# Patient Record
Sex: FEMALE | Race: WHITE | Employment: STUDENT | URBAN - METROPOLITAN AREA
[De-identification: names, ages, dates, MRNs, and addresses within clinical notes are randomized per-mention and may not be internally consistent; named-entity substitution may affect disease eponyms.]

---
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
## 2023-04-27 ENCOUNTER — OFFICE VISIT (OUTPATIENT)
Dept: FAMILY MEDICINE CLINIC | Age: 18
End: 2023-04-27
Payer: COMMERCIAL

## 2023-04-27 VITALS
BODY MASS INDEX: 26.53 KG/M2 | TEMPERATURE: 98.5 F | HEART RATE: 115 BPM | OXYGEN SATURATION: 98 % | SYSTOLIC BLOOD PRESSURE: 118 MMHG | DIASTOLIC BLOOD PRESSURE: 80 MMHG | WEIGHT: 159.4 LBS

## 2023-04-27 DIAGNOSIS — S39.012A STRAIN OF LUMBAR REGION, INITIAL ENCOUNTER: Primary | ICD-10-CM

## 2023-04-27 PROCEDURE — 96372 THER/PROPH/DIAG INJ SC/IM: CPT | Performed by: PHYSICIAN ASSISTANT

## 2023-04-27 PROCEDURE — 99214 OFFICE O/P EST MOD 30 MIN: CPT | Performed by: PHYSICIAN ASSISTANT

## 2023-04-27 PROCEDURE — G8427 DOCREV CUR MEDS BY ELIG CLIN: HCPCS | Performed by: PHYSICIAN ASSISTANT

## 2023-04-27 PROCEDURE — 1036F TOBACCO NON-USER: CPT | Performed by: PHYSICIAN ASSISTANT

## 2023-04-27 PROCEDURE — G8417 CALC BMI ABV UP PARAM F/U: HCPCS | Performed by: PHYSICIAN ASSISTANT

## 2023-04-27 RX ORDER — TIZANIDINE 4 MG/1
4 TABLET ORAL 4 TIMES DAILY PRN
Qty: 20 TABLET | Refills: 0 | Status: SHIPPED | OUTPATIENT
Start: 2023-04-27

## 2023-04-27 RX ORDER — METHYLPREDNISOLONE ACETATE 80 MG/ML
80 INJECTION, SUSPENSION INTRA-ARTICULAR; INTRALESIONAL; INTRAMUSCULAR; SOFT TISSUE ONCE
Status: COMPLETED | OUTPATIENT
Start: 2023-04-27 | End: 2023-04-27

## 2023-04-27 RX ORDER — PREDNISONE 10 MG/1
TABLET ORAL
Qty: 18 TABLET | Refills: 0 | Status: SHIPPED | OUTPATIENT
Start: 2023-04-27

## 2023-04-27 RX ORDER — KETOROLAC TROMETHAMINE 30 MG/ML
30 INJECTION, SOLUTION INTRAMUSCULAR; INTRAVENOUS ONCE
Status: COMPLETED | OUTPATIENT
Start: 2023-04-27 | End: 2023-04-27

## 2023-04-27 RX ADMIN — KETOROLAC TROMETHAMINE 30 MG: 30 INJECTION, SOLUTION INTRAMUSCULAR; INTRAVENOUS at 09:01

## 2023-04-27 RX ADMIN — METHYLPREDNISOLONE ACETATE 80 MG: 80 INJECTION, SUSPENSION INTRA-ARTICULAR; INTRALESIONAL; INTRAMUSCULAR; SOFT TISSUE at 09:02

## 2023-04-27 NOTE — PROGRESS NOTES
21  Penelope Jamil : 2005 Sex: female  Age 21 y.o. Subjective:  Chief Complaint   Patient presents with    Back Injury     Injured back last night at work         HPI:   Penelope Jamil , 21 y.o. female presents to express care for evaluation of back pain    HPI  68-year-old female presents to express care for evaluation of low back pain. The patient has had this low back pain ongoing for the last 12 hours. The patient states that last night on night shift about 7 PM the patient was lifting heavy boxes and bread from [employer]. The patient states that she injured her back at that point. No traumatic injury. The patient had bent over and felt a pop in her back. The patient does not have any bladder or bowel incontinence, urinary tension or saddle anesthesia. No dysuria, hematuria. No abdominal pain. The patient states that any movement seems to make it worse. The patient is not currently on any muscle relaxants or anti-inflammatories. ROS:   Unless otherwise stated in this report the patient's positive and negative responses for review of systems for constitutional, eyes, ENT, cardiovascular, respiratory, gastrointestinal, neurological, , musculoskeletal, and integument systems and related systems to the presenting problem are either stated in the history of present illness or were not pertinent or were negative for the symptoms and/or complaints related to the presenting medical problem. Positives and pertinent negatives as per HPI. All others reviewed and are negative. PMH:     Past Medical History:   Diagnosis Date    Anxiety     Constipation     Depression     Metabolic syndrome     UTI (urinary tract infection)     patient gets frequently        Past Surgical History:   Procedure Laterality Date    APPENDECTOMY         No family history on file.     Medications:     Current Outpatient Medications:     tiZANidine (ZANAFLEX) 4 MG tablet, Take 1 tablet by mouth 4 times

## 2023-05-11 ENCOUNTER — HOSPITAL ENCOUNTER (OUTPATIENT)
Dept: PHYSICAL THERAPY | Age: 18
Setting detail: THERAPIES SERIES
Discharge: HOME | End: 2023-05-11
Payer: COMMERCIAL

## 2023-05-11 PROCEDURE — 97161 PT EVAL LOW COMPLEX 20 MIN: CPT

## 2023-05-11 PROCEDURE — G0283 ELEC STIM OTHER THAN WOUND: HCPCS

## 2023-05-12 ENCOUNTER — HOSPITAL ENCOUNTER (OUTPATIENT)
Dept: PHYSICAL THERAPY | Age: 18
Setting detail: THERAPIES SERIES
Discharge: HOME | End: 2023-05-12
Payer: COMMERCIAL

## 2023-05-12 PROCEDURE — 97035 APP MDLTY 1+ULTRASOUND EA 15: CPT

## 2023-05-12 PROCEDURE — 97110 THERAPEUTIC EXERCISES: CPT

## 2023-05-12 PROCEDURE — G0283 ELEC STIM OTHER THAN WOUND: HCPCS

## 2023-05-12 NOTE — PROGRESS NOTES
[facility]  Phone: [phone] Fax: [phone]       Physical Therapy Daily Treatment Note  Date:  2023    Patient Name:  Gaye Kowalski   :  2005  MRN: [id number]    Restrictions/Precautions:    Diagnosis:  Back Injury   Treatment Diagnosis:    Insurance/Certification information:  industrial   Referring Physician:  Dr Talon Matson of care signed (Y/N):    Visit# / total visits:    Pain level: /10  Time In:  1000     Time Out:   1045     Subjective:      Exercises:  Exercise/Equipment Resistance/Repetitions Other comments                                                                                                                                             Other Therapeutic Activities:      Home Exercise Program:  Prayer str                                                Lateral Prayer Str                                               Prone Prop                                               Cat/Cow    Manual Treatments:      Modalities:  US Left Lumbar Quadrant 7 min                        Estim Left posterior hip region 20 min     Timed Code Treatment Minutes:  45    Total Treatment Minutes:  45    Treatment/Activity Tolerance:  [x] Patient tolerated treatment well [] Patient limited by fatigue  [] Patient limited by pain  [] Patient limited by other medical complications  [] Other:     Plan:   [x] Continue per plan of care [] Alter current plan (see comments)  [] Plan of care initiated [] Hold pending MD visit [] Discharge  Plan for Next Session:         Treatment Charges: Mins Units   Initial Evaluation     Re-Evaluation     Ther Exercise         TE 15 1   Manual Therapy     MT     Ther Activities        TA     Gait Training          GT     Neuro Re-education NR     Modalities 30 2   Non-Billable Service Time     Other     Total Time/Units 45 3     Electronically signed by:  Gregery Arms, [address] [place] [address]

## 2023-05-15 ENCOUNTER — HOSPITAL ENCOUNTER (OUTPATIENT)
Dept: PHYSICAL THERAPY | Age: 18
Setting detail: THERAPIES SERIES
Discharge: HOME | End: 2023-05-15
Payer: COMMERCIAL

## 2023-05-15 PROCEDURE — 97110 THERAPEUTIC EXERCISES: CPT

## 2023-05-15 PROCEDURE — G0283 ELEC STIM OTHER THAN WOUND: HCPCS

## 2023-05-15 PROCEDURE — 97035 APP MDLTY 1+ULTRASOUND EA 15: CPT

## 2023-05-15 NOTE — PROGRESS NOTES
[facility]  Phone: [phone] Fax: [phone]       Physical Therapy Daily Treatment Note  Date:  5/15/2023    Patient Name:  Terry Eng   :  2005  MRN: [id number]    Restrictions/Precautions:    Diagnosis:  Back Injury   Treatment Diagnosis:    Insurance/Certification information:  industrial   Referring Physician:  Dr Fuentes Patient of care signed (Y/N):    Visit# / total visits:  3/12  Pain level: 10  Time In:  830    Time Out:   930     Subjective:      Exercises:  Exercise/Equipment Resistance/Repetitions Other comments     Prayer str 10 reps       Lateral Prayer str 5 reps bilateral       Cat st 10 reps      Prone Prop  10 reps       Doorway str for [employer] 5 reps bilateral                                                                                                            Other Therapeutic Activities:      Home Exercise Program:  Access Code: [email or web address]  URL: [email or web address]  Date: 05/15/2023  Prepared by: Hayden Cuff    Exercises  - Standing Quadratus Lumborum Stretch with Doorway  - 1-2 x daily - 7 x weekly - 1 sets - 5 reps - 3 hold    Manual Treatments:      Modalities:  US Left Lumbar Quadrant 7 min                        Estim Left posterior hip region 20 min     Timed Code Treatment Minutes:  45    Total Treatment Minutes:  45    Treatment/Activity Tolerance:  [x] Patient tolerated treatment well [] Patient limited by fatigue  [] Patient limited by pain  [] Patient limited by other medical complications  [] Other:     Plan:   [x] Continue per plan of care [] Alter current plan (see comments)  [] Plan of care initiated [] Hold pending MD visit [] Discharge  Plan for Next Session:         Treatment Charges: Mins Units   Initial Evaluation     Re-Evaluation     Ther Exercise         TE 15 1   Manual Therapy     MT     Ther Activities        TA     Gait Training          GT     Neuro Re-education NR     Modalities 30 2   Non-Billable

## 2023-05-17 ENCOUNTER — HOSPITAL ENCOUNTER (OUTPATIENT)
Dept: PHYSICAL THERAPY | Age: 18
Setting detail: THERAPIES SERIES
Discharge: HOME | End: 2023-05-17
Payer: COMMERCIAL

## 2023-05-17 PROCEDURE — G0283 ELEC STIM OTHER THAN WOUND: HCPCS

## 2023-05-17 PROCEDURE — 97110 THERAPEUTIC EXERCISES: CPT

## 2023-05-17 NOTE — PROGRESS NOTES
[facility]  Phone: [phone] Fax: [phone]       Physical Therapy Daily Treatment Note  Date:  2023    Patient Name:  Cynthia Alatorre   :  2005  MRN: [id number]    Restrictions/Precautions:    Diagnosis:  Back Injury   Treatment Diagnosis:    Insurance/Certification information:  industrial   Referring Physician:  Dr Samira Messer of care signed (Y/N):    Visit# / total visits:    Pain level: 10  Time In:  830    Time Out:   930     Subjective:      Exercises:  Exercise/Equipment Resistance/Repetitions Other comments     Prayer str 10 reps       Lateral Prayer str 5 reps bilateral       Cat st 10 reps      Prone Prop  10 reps       Doorway str for [employer] 5 reps bilateral                                                                                                            Other Therapeutic Activities:      Home Exercise Program:  Access Code: [email or web address]  URL: [email or web address]  Date: 05/15/2023  Prepared by: Jean Bishop    Exercises  - Standing Quadratus Lumborum Stretch with Doorway  - 1-2 x daily - 7 x weekly - 1 sets - 5 reps - 3 hold    Manual Treatments:      Modalities:  US Left Lumbar Quadrant 7 min                        Estim Left posterior hip region 20 min     Timed Code Treatment Minutes:  45    Total Treatment Minutes:  45    Treatment/Activity Tolerance:  [x] Patient tolerated treatment well [] Patient limited by fatigue  [] Patient limited by pain  [] Patient limited by other medical complications  [] Other:     Plan:   [x] Continue per plan of care [] Alter current plan (see comments)  [] Plan of care initiated [] Hold pending MD visit [] Discharge  Plan for Next Session:         Treatment Charges: Mins Units   Initial Evaluation     Re-Evaluation     Ther Exercise         TE 15 1   Manual Therapy     MT     Ther Activities        TA     Gait Training          GT     Neuro Re-education NR     Modalities 30 2   Non-Billable

## 2023-05-18 ENCOUNTER — HOSPITAL ENCOUNTER (OUTPATIENT)
Dept: PHYSICAL THERAPY | Age: 18
Setting detail: THERAPIES SERIES
Discharge: HOME | End: 2023-05-18
Payer: COMMERCIAL

## 2023-05-18 PROCEDURE — 97110 THERAPEUTIC EXERCISES: CPT

## 2023-05-18 PROCEDURE — G0283 ELEC STIM OTHER THAN WOUND: HCPCS

## 2023-05-18 NOTE — PROGRESS NOTES
[facility]  Phone: [phone] Fax: [phone]       Physical Therapy Daily Treatment Note  Date:  2023    Patient Name:  Adrian Chi   :  2005  MRN: [id number]    Restrictions/Precautions:    Diagnosis:  Back Injury   Treatment Diagnosis:    Insurance/Certification information:  industrial   Referring Physician:  Dr Rhina Quevedo of care signed (Y/N):    Visit# / total visits:    Pain level: 10  Time In:  830    Time Out:   930     Subjective:      Exercises:  Exercise/Equipment Resistance/Repetitions Other comments     Prayer str 10 reps       Lateral Prayer str 5 reps bilateral       Cat st 10 reps      Prone Prop  10 reps       Doorway str for Quad Lumborum 5 reps bilateral     \"L\" Str   5 reps                                                                                                     Other Therapeutic Activities:      Home Exercise Program:  Access Code: [email or web address]  URL: [email or web address]  Date: 05/15/2023  Prepared by: Bobbi Whitehead    Exercises  - Standing Quadratus Lumborum Stretch with Doorway  - 1-2 x daily - 7 x weekly - 1 sets - 5 reps - 3 hold    Manual Treatments:      Modalities:                         Estim Left posterior hip region 20 min     Timed Code Treatment Minutes:  30    Total Treatment Minutes:  40    Treatment/Activity Tolerance:  [x] Patient tolerated treatment well [] Patient limited by fatigue  [] Patient limited by pain  [] Patient limited by other medical complications  [] Other:     Plan:   [x] Continue per plan of care [] Alter current plan (see comments)  [] Plan of care initiated [] Hold pending MD visit [] Discharge  Plan for Next Session:         Treatment Charges: Mins Units   Initial Evaluation     Re-Evaluation     Ther Exercise         TE 15 1   Manual Therapy     MT     Ther Activities        TA     Gait Training          GT     Neuro Re-education NR     Modalities 20 1   Non-Billable Service Time

## 2023-05-24 ENCOUNTER — HOSPITAL ENCOUNTER (OUTPATIENT)
Dept: PHYSICAL THERAPY | Age: 18
Setting detail: THERAPIES SERIES
Discharge: HOME | End: 2023-05-24
Payer: COMMERCIAL

## 2023-05-24 PROCEDURE — G0283 ELEC STIM OTHER THAN WOUND: HCPCS

## 2023-05-24 PROCEDURE — 97110 THERAPEUTIC EXERCISES: CPT

## 2023-05-24 NOTE — PROGRESS NOTES
[facility]  Phone: [phone] Fax: [phone]       Physical Therapy Daily Treatment Note  Date:  2023    Patient Name:  Antione Sánchez   :  2005  MRN: [id number]    Restrictions/Precautions:    Diagnosis:  Back Injury   Treatment Diagnosis:    Insurance/Certification information:  industrial   Referring Physician:  Dr Mckenzie Levels of care signed (Y/N):    Visit# / total visits:    Pain level: /10  Time In:  830    Time Out:   930     Subjective:      Exercises:  Exercise/Equipment Resistance/Repetitions Other comments     Prayer str 10 reps       Lateral Prayer str 5 reps bilateral       Cat st 10 reps      Prone Prop  10 reps       Doorway str for Quad Lumborum 5 reps bilateral     \"L\" Str   5 reps                                                                                                     Other Therapeutic Activities:      Home Exercise Program:  Access Code: [email or web address]  URL: [email or web address]  Date: 05/15/2023  Prepared by: Kaylen Blanc    Exercises  - Standing Quadratus Lumborum Stretch with Doorway  - 1-2 x daily - 7 x weekly - 1 sets - 5 reps - 3 hold    Manual Treatments:      Modalities:                         Estim Left posterior hip region 20 min     Timed Code Treatment Minutes:  30    Total Treatment Minutes:  40    Treatment/Activity Tolerance:  [x] Patient tolerated treatment well [] Patient limited by fatigue  [] Patient limited by pain  [] Patient limited by other medical complications  [] Other:     Plan:   [x] Continue per plan of care [] Alter current plan (see comments)  [] Plan of care initiated [] Hold pending MD visit [] Discharge  Plan for Next Session:         Treatment Charges: Mins Units   Initial Evaluation     Re-Evaluation     Ther Exercise         TE 15 1   Manual Therapy     MT     Ther Activities        TA     Gait Training          GT     Neuro Re-education NR     Modalities 20 1   Non-Billable Service Time

## 2023-05-26 ENCOUNTER — HOSPITAL ENCOUNTER (OUTPATIENT)
Dept: PHYSICAL THERAPY | Age: 18
Setting detail: THERAPIES SERIES
End: 2023-05-26
Payer: COMMERCIAL

## 2023-05-30 ENCOUNTER — HOSPITAL ENCOUNTER (OUTPATIENT)
Dept: PHYSICAL THERAPY | Age: 18
Setting detail: THERAPIES SERIES
Discharge: HOME | End: 2023-05-30
Payer: COMMERCIAL

## 2023-05-30 PROCEDURE — G0283 ELEC STIM OTHER THAN WOUND: HCPCS

## 2023-05-30 PROCEDURE — 97110 THERAPEUTIC EXERCISES: CPT

## 2023-05-30 NOTE — PROGRESS NOTES
[facility]  Phone: [phone] Fax: [phone]       Physical Therapy Daily Treatment Note  Date:  2023    Patient Name:  Gaye Kowalski   :  2005  MRN: [id number]    Restrictions/Precautions:    Diagnosis:  Back Injury   Treatment Diagnosis:    Insurance/Certification information:  industrial   Referring Physician:  Dr Talon Matson of care signed (Y/N):    Visit# / total visits:    Pain level: 10  Time In:  830    Time Out:   930     Subjective:      Exercises:  Exercise/Equipment Resistance/Repetitions Other comments     Prayer str 10 reps       Lateral Prayer str 5 reps bilateral       Cat st 10 reps      Prone Prop  10 reps       Doorway str for Quad Lumborum 5 reps bilateral     \"L\" Str   5 reps                                                                                                     Other Therapeutic Activities:      Home Exercise Program:  Access Code: [email or web address]  URL: [email or web address]  Date: 05/15/2023  Prepared by: Weston Arms    Exercises  - Standing Quadratus Lumborum Stretch with Doorway  - 1-2 x daily - 7 x weekly - 1 sets - 5 reps - 3 hold    Manual Treatments:      Modalities:                         Estim Left posterior hip region 20 min     Timed Code Treatment Minutes:  30    Total Treatment Minutes:  40    Treatment/Activity Tolerance:  [x] Patient tolerated treatment well [] Patient limited by fatigue  [] Patient limited by pain  [] Patient limited by other medical complications  [] Other:     Plan:   [x] Continue per plan of care [] Alter current plan (see comments)  [] Plan of care initiated [] Hold pending MD visit [] Discharge  Plan for Next Session:         Treatment Charges: Mins Units   Initial Evaluation     Re-Evaluation     Ther Exercise         TE 15 1   Manual Therapy     MT     Ther Activities        TA     Gait Training          GT     Neuro Re-education NR     Modalities 20 1   Non-Billable Service Time

## 2023-06-01 ENCOUNTER — HOSPITAL ENCOUNTER (OUTPATIENT)
Dept: PHYSICAL THERAPY | Age: 18
Setting detail: THERAPIES SERIES
Discharge: HOME | End: 2023-06-01
Payer: COMMERCIAL

## 2023-06-01 PROCEDURE — G0283 ELEC STIM OTHER THAN WOUND: HCPCS

## 2023-06-01 PROCEDURE — 97110 THERAPEUTIC EXERCISES: CPT

## 2023-06-01 NOTE — PROGRESS NOTES
[facility]  Phone: [phone] Fax: [phone]       Physical Therapy Daily Treatment Note  Date:  2023    Patient Name:  Brooks Obando   :  2005  MRN: [id number]    Restrictions/Precautions:    Diagnosis:  Back Injury   Treatment Diagnosis:    Insurance/Certification information:  industrial   Referring Physician:  Dr Melba Mera of care signed (Y/N):    Visit# / total visits:    Pain level: 10  Time In:  830    Time Out:   930     Subjective:      Exercises:  Exercise/Equipment Resistance/Repetitions Other comments     Prayer str 10 reps       Lateral Prayer str 5 reps bilateral       Cat st 10 reps      Prone Prop  10 reps       Doorway str for Quad Lumborum 5 reps bilateral     \"L\" Str   5 reps                                                                                                     Other Therapeutic Activities:      Home Exercise Program:  Access Code: [email or web address]  URL: [email or web address]  Date: 05/15/2023  Prepared by: Janey Miller    Exercises  - Standing Quadratus Lumborum Stretch with Doorway  - 1-2 x daily - 7 x weekly - 1 sets - 5 reps - 3 hold    Manual Treatments:      Modalities:                         Estim Left posterior hip region 20 min     Timed Code Treatment Minutes:  30    Total Treatment Minutes:  40    Treatment/Activity Tolerance:  [x] Patient tolerated treatment well [] Patient limited by fatigue  [] Patient limited by pain  [] Patient limited by other medical complications  [] Other:     Plan:   [x] Continue per plan of care [] Alter current plan (see comments)  [] Plan of care initiated [] Hold pending MD visit [] Discharge  Plan for Next Session:         Treatment Charges: Mins Units   Initial Evaluation     Re-Evaluation     Ther Exercise         TE 15 1   Manual Therapy     MT     Ther Activities        TA     Gait Training          GT     Neuro Re-education NR     Modalities 20 1   Non-Billable Service Time

## 2023-06-02 ENCOUNTER — HOSPITAL ENCOUNTER (OUTPATIENT)
Dept: PHYSICAL THERAPY | Age: 18
Setting detail: THERAPIES SERIES
Discharge: HOME | End: 2023-06-02
Payer: COMMERCIAL

## 2023-06-02 PROCEDURE — G0283 ELEC STIM OTHER THAN WOUND: HCPCS

## 2023-06-02 PROCEDURE — 97110 THERAPEUTIC EXERCISES: CPT

## 2023-06-02 NOTE — PROGRESS NOTES
[facility]  Phone: [phone] Fax: [phone]       Physical Therapy Daily Treatment Note  Date:  2023    Patient Name:  Stanislav Moe   :  2005  MRN: [id number]    Restrictions/Precautions:    Diagnosis:  Back Injury   Treatment Diagnosis:    Insurance/Certification information:  industrial   Referring Physician:  Dr Jailene Betts of care signed (Y/N):    Visit# / total visits:    Pain level: /10  Time In:  830    Time Out:   930     Subjective:      Exercises:  Exercise/Equipment Resistance/Repetitions Other comments     Prayer str 10 reps       Lateral Prayer str 5 reps bilateral       Cat st 10 reps      Prone Prop  10 reps       Doorway str for Quad Lumborum 5 reps bilateral     \"L\" Str   5 reps                                                                                                     Other Therapeutic Activities:      Home Exercise Program:  Access Code: [email or web address]  URL: [email or web address]  Date: 05/15/2023  Prepared by: Karl Dancer    Exercises  - Standing Quadratus Lumborum Stretch with Doorway  - 1-2 x daily - 7 x weekly - 1 sets - 5 reps - 3 hold    Manual Treatments:      Modalities:                         Estim Left posterior hip region 20 min     Timed Code Treatment Minutes:  30    Total Treatment Minutes:  40    Treatment/Activity Tolerance:  [x] Patient tolerated treatment well [] Patient limited by fatigue  [] Patient limited by pain  [] Patient limited by other medical complications  [] Other:     Plan:   [x] Continue per plan of care [] Alter current plan (see comments)  [] Plan of care initiated [] Hold pending MD visit [] Discharge  Plan for Next Session:         Treatment Charges: Mins Units   Initial Evaluation     Re-Evaluation     Ther Exercise         TE 15 1   Manual Therapy     MT     Ther Activities        TA     Gait Training          GT     Neuro Re-education NR     Modalities 20 1   Non-Billable Service Time

## 2023-06-08 ENCOUNTER — HOSPITAL ENCOUNTER (OUTPATIENT)
Dept: PHYSICAL THERAPY | Age: 18
Setting detail: THERAPIES SERIES
Discharge: HOME | End: 2023-06-08
Payer: COMMERCIAL

## 2023-06-08 PROCEDURE — 97110 THERAPEUTIC EXERCISES: CPT

## 2023-06-08 PROCEDURE — G0283 ELEC STIM OTHER THAN WOUND: HCPCS

## 2023-06-08 NOTE — PROGRESS NOTES
[facility]  Phone: [phone] Fax: [phone]       Physical Therapy Daily Treatment Note  Date:  2023    Patient Name:  Angeles Ba   :  2005  MRN: [id number]    Restrictions/Precautions:    Diagnosis:  Back Injury   Treatment Diagnosis:    Insurance/Certification information:  industrial   Referring Physician:  Dr Verona Rosenbaum of care signed (Y/N):    Visit# / total visits:  10/12  Pain level: /10  Time In:  830    Time Out:   930     Subjective:      Exercises:  Exercise/Equipment Resistance/Repetitions Other comments     Prayer str 10 reps       Lateral Prayer str 5 reps bilateral       Cat st 10 reps      Prone Prop  10 reps       Doorway str for Quad Lumborum 5 reps bilateral     \"L\" Str   5 reps                                                                                                     Other Therapeutic Activities:      Home Exercise Program:  Access Code: [email or web address]  URL: [email or web address]  Date: 05/15/2023  Prepared by: Peter Garner    Exercises  - Standing Quadratus Lumborum Stretch with Doorway  - 1-2 x daily - 7 x weekly - 1 sets - 5 reps - 3 hold    Manual Treatments:      Modalities:                         Estim Left posterior hip region 20 min     Timed Code Treatment Minutes:  30    Total Treatment Minutes:  40    Treatment/Activity Tolerance:  [x] Patient tolerated treatment well [] Patient limited by fatigue  [] Patient limited by pain  [] Patient limited by other medical complications  [] Other:     Plan:   [x] Continue per plan of care [] Alter current plan (see comments)  [] Plan of care initiated [] Hold pending MD visit [] Discharge  Plan for Next Session:         Treatment Charges: Mins Units   Initial Evaluation     Re-Evaluation     Ther Exercise         TE 15 1   Manual Therapy     MT     Ther Activities        TA     Gait Training          GT     Neuro Re-education NR     Modalities 20 1   Non-Billable Service Time

## 2023-06-24 ENCOUNTER — APPOINTMENT (OUTPATIENT)
Dept: CT IMAGING | Age: 18
End: 2023-06-24
Payer: COMMERCIAL

## 2023-06-24 ENCOUNTER — HOSPITAL ENCOUNTER (EMERGENCY)
Age: 18
Discharge: HOME | End: 2023-06-24
Attending: STUDENT IN AN ORGANIZED HEALTH CARE EDUCATION/TRAINING PROGRAM
Payer: COMMERCIAL

## 2023-06-24 ENCOUNTER — APPOINTMENT (OUTPATIENT)
Dept: GENERAL RADIOLOGY | Age: 18
End: 2023-06-24
Payer: COMMERCIAL

## 2023-06-24 VITALS
WEIGHT: 150 LBS | BODY MASS INDEX: 24.11 KG/M2 | SYSTOLIC BLOOD PRESSURE: 125 MMHG | RESPIRATION RATE: 16 BRPM | OXYGEN SATURATION: 99 % | DIASTOLIC BLOOD PRESSURE: 74 MMHG | HEIGHT: 66 IN | HEART RATE: 91 BPM | TEMPERATURE: 99.3 F

## 2023-06-24 DIAGNOSIS — J02.9 ACUTE PHARYNGITIS, UNSPECIFIED ETIOLOGY: Primary | ICD-10-CM

## 2023-06-24 LAB
ANION GAP SERPL CALCULATED.3IONS-SCNC: 13 MMOL/L (ref 7–16)
BASOPHILS # BLD: 0 E9/L (ref 0–0.2)
BASOPHILS NFR BLD: 0 % (ref 0–2)
BUN SERPL-MCNC: 10 MG/DL (ref 6–20)
CALCIUM SERPL-MCNC: 9.7 MG/DL (ref 8.6–10.2)
CHLORIDE SERPL-SCNC: 102 MMOL/L (ref 98–107)
CO2 SERPL-SCNC: 22 MMOL/L (ref 22–29)
CREAT SERPL-MCNC: 1 MG/DL (ref 0.5–1)
D DIMER: <200 NG/ML DDU
EBV VCA AB SER QL: NEGATIVE
EOSINOPHIL # BLD: 0 E9/L (ref 0.05–0.5)
EOSINOPHIL NFR BLD: 0 % (ref 0–6)
ERYTHROCYTE [DISTWIDTH] IN BLOOD BY AUTOMATED COUNT: 12.9 FL (ref 11.5–15)
GLUCOSE SERPL-MCNC: 84 MG/DL (ref 74–99)
HCG, URINE, POC: NEGATIVE
HCT VFR BLD AUTO: 40 % (ref 34–48)
HGB BLD-MCNC: 13.4 G/DL (ref 11.5–15.5)
LYMPHOCYTES # BLD: 0.47 E9/L (ref 1.5–4)
LYMPHOCYTES NFR BLD: 5.2 % (ref 20–42)
Lab: NORMAL
MCH RBC QN AUTO: 28.2 PG (ref 26–35)
MCHC RBC AUTO-ENTMCNC: 33.5 % (ref 32–34.5)
MCV RBC AUTO: 84.2 FL (ref 80–99.9)
MONOCYTES # BLD: 0.38 E9/L (ref 0.1–0.95)
MONOCYTES NFR BLD: 3.5 % (ref 2–12)
NEGATIVE QC PASS/FAIL: NORMAL
NEUTROPHILS # BLD: 8.55 E9/L (ref 1.8–7.3)
NEUTS SEG NFR BLD: 91.3 % (ref 43–80)
NRBC BLD-RTO: 0 /100 WBC
PLATELET # BLD AUTO: 243 E9/L (ref 130–450)
PMV BLD AUTO: 10.4 FL (ref 7–12)
POSITIVE QC PASS/FAIL: NORMAL
POTASSIUM SERPL-SCNC: 4 MMOL/L (ref 3.5–5)
RBC # BLD AUTO: 4.75 E12/L (ref 3.5–5.5)
RBC MORPH BLD: NORMAL
SARS-COV-2 RDRP RESP QL NAA+PROBE: NOT DETECTED
SODIUM SERPL-SCNC: 137 MMOL/L (ref 132–146)
STREP GRP A PCR: NEGATIVE
TROPONIN, HIGH SENSITIVITY: <6 NG/L (ref 0–9)
WBC # BLD: 9.4 E9/L (ref 4.5–11.5)

## 2023-06-24 PROCEDURE — A4216 STERILE WATER/SALINE, 10 ML: HCPCS | Performed by: STUDENT IN AN ORGANIZED HEALTH CARE EDUCATION/TRAINING PROGRAM

## 2023-06-24 PROCEDURE — 2500000003 HC RX 250 WO HCPCS: Performed by: STUDENT IN AN ORGANIZED HEALTH CARE EDUCATION/TRAINING PROGRAM

## 2023-06-24 PROCEDURE — 86308 HETEROPHILE ANTIBODY SCREEN: CPT

## 2023-06-24 PROCEDURE — 70491 CT SOFT TISSUE NECK W/DYE: CPT

## 2023-06-24 PROCEDURE — 87880 STREP A ASSAY W/OPTIC: CPT

## 2023-06-24 PROCEDURE — 84484 ASSAY OF TROPONIN QUANT: CPT

## 2023-06-24 PROCEDURE — 80048 BASIC METABOLIC PNL TOTAL CA: CPT

## 2023-06-24 PROCEDURE — 2580000003 HC RX 258

## 2023-06-24 PROCEDURE — 6360000002 HC RX W HCPCS: Performed by: STUDENT IN AN ORGANIZED HEALTH CARE EDUCATION/TRAINING PROGRAM

## 2023-06-24 PROCEDURE — 71045 X-RAY EXAM CHEST 1 VIEW: CPT

## 2023-06-24 PROCEDURE — 6370000000 HC RX 637 (ALT 250 FOR IP)

## 2023-06-24 PROCEDURE — 96374 THER/PROPH/DIAG INJ IV PUSH: CPT

## 2023-06-24 PROCEDURE — 85025 COMPLETE CBC W/AUTO DIFF WBC: CPT

## 2023-06-24 PROCEDURE — 85378 FIBRIN DEGRADE SEMIQUANT: CPT

## 2023-06-24 PROCEDURE — 2580000003 HC RX 258: Performed by: STUDENT IN AN ORGANIZED HEALTH CARE EDUCATION/TRAINING PROGRAM

## 2023-06-24 PROCEDURE — 96375 TX/PRO/DX INJ NEW DRUG ADDON: CPT

## 2023-06-24 PROCEDURE — 2580000003 HC RX 258: Performed by: RADIOLOGY

## 2023-06-24 PROCEDURE — 87635 SARS-COV-2 COVID-19 AMP PRB: CPT

## 2023-06-24 PROCEDURE — 99285 EMERGENCY DEPT VISIT HI MDM: CPT

## 2023-06-24 PROCEDURE — 93005 ELECTROCARDIOGRAM TRACING: CPT | Performed by: STUDENT IN AN ORGANIZED HEALTH CARE EDUCATION/TRAINING PROGRAM

## 2023-06-24 PROCEDURE — 6360000004 HC RX CONTRAST MEDICATION: Performed by: RADIOLOGY

## 2023-06-24 RX ORDER — DIPHENHYDRAMINE HYDROCHLORIDE 50 MG/ML
25 INJECTION INTRAMUSCULAR; INTRAVENOUS ONCE
Status: COMPLETED | OUTPATIENT
Start: 2023-06-24 | End: 2023-06-24

## 2023-06-24 RX ORDER — 0.9 % SODIUM CHLORIDE 0.9 %
1000 INTRAVENOUS SOLUTION INTRAVENOUS ONCE
Status: COMPLETED | OUTPATIENT
Start: 2023-06-24 | End: 2023-06-24

## 2023-06-24 RX ORDER — LORAZEPAM 1 MG/1
1 TABLET ORAL ONCE
Status: COMPLETED | OUTPATIENT
Start: 2023-06-24 | End: 2023-06-24

## 2023-06-24 RX ORDER — MAGNESIUM HYDROXIDE/ALUMINUM HYDROXICE/SIMETHICONE 120; 1200; 1200 MG/30ML; MG/30ML; MG/30ML
30 SUSPENSION ORAL ONCE
Status: COMPLETED | OUTPATIENT
Start: 2023-06-24 | End: 2023-06-24

## 2023-06-24 RX ORDER — PREDNISONE 20 MG/1
20 TABLET ORAL 2 TIMES DAILY
Qty: 10 TABLET | Refills: 0 | Status: SHIPPED | OUTPATIENT
Start: 2023-06-24 | End: 2023-06-29

## 2023-06-24 RX ORDER — KETOROLAC TROMETHAMINE 30 MG/ML
30 INJECTION, SOLUTION INTRAMUSCULAR; INTRAVENOUS ONCE
Status: COMPLETED | OUTPATIENT
Start: 2023-06-24 | End: 2023-06-24

## 2023-06-24 RX ORDER — DEXAMETHASONE SODIUM PHOSPHATE 10 MG/ML
10 INJECTION INTRAMUSCULAR; INTRAVENOUS ONCE
Status: COMPLETED | OUTPATIENT
Start: 2023-06-24 | End: 2023-06-24

## 2023-06-24 RX ORDER — SODIUM CHLORIDE 0.9 % (FLUSH) 0.9 %
10 SYRINGE (ML) INJECTION PRN
Status: COMPLETED | OUTPATIENT
Start: 2023-06-24 | End: 2023-06-24

## 2023-06-24 RX ORDER — LORAZEPAM 1 MG/1
TABLET ORAL
Status: COMPLETED
Start: 2023-06-24 | End: 2023-06-24

## 2023-06-24 RX ADMIN — KETOROLAC TROMETHAMINE 30 MG: 30 INJECTION, SOLUTION INTRAMUSCULAR; INTRAVENOUS at 18:10

## 2023-06-24 RX ADMIN — LORAZEPAM 1 MG: 1 TABLET ORAL at 20:06

## 2023-06-24 RX ADMIN — DIPHENHYDRAMINE HYDROCHLORIDE 25 MG: 50 INJECTION, SOLUTION INTRAMUSCULAR; INTRAVENOUS at 19:59

## 2023-06-24 RX ADMIN — LORAZEPAM 1 MG: 1 TABLET ORAL at 20:05

## 2023-06-24 RX ADMIN — SODIUM CHLORIDE, PRESERVATIVE FREE 10 ML: 5 INJECTION INTRAVENOUS at 18:54

## 2023-06-24 RX ADMIN — IOPAMIDOL 75 ML: 755 INJECTION, SOLUTION INTRAVENOUS at 18:58

## 2023-06-24 RX ADMIN — FAMOTIDINE 20 MG: 10 INJECTION, SOLUTION INTRAVENOUS at 20:01

## 2023-06-24 RX ADMIN — ALUMINUM HYDROXIDE, MAGNESIUM HYDROXIDE, AND SIMETHICONE 30 ML: 200; 200; 20 SUSPENSION ORAL at 19:58

## 2023-06-24 RX ADMIN — DEXAMETHASONE SODIUM PHOSPHATE 10 MG: 10 INJECTION INTRAMUSCULAR; INTRAVENOUS at 18:10

## 2023-06-24 RX ADMIN — SODIUM CHLORIDE 1000 ML: 9 INJECTION, SOLUTION INTRAVENOUS at 18:10

## 2023-06-24 ASSESSMENT — PAIN SCALES - GENERAL: PAINLEVEL_OUTOF10: 6

## 2023-06-24 NOTE — ED PROVIDER NOTES
agreeable with this. They have been instructed to follow-up with referred family physician as soon as possible and are provided with strict return precautions as to which they should return to the nearest available emergency department. Patient acknowledges understanding of all of these instructions and is to be discharged at this time. Differential diagnosis includes but is not limited to: Strep pharyngitis, retropharyngeal abscess, peritonsillar abscess, acute upper respiratory viral illness. Please refer to the ED Course as available for additional MDM. ED Course as of 06/24/23 2146   Sat Jun 24, 2023   1829 EKG: This EKG is signed and interpreted by me. Rate: 104  Rhythm: Sinus  Interpretation: Sinus tachycardia, right axis, left atrial enlargement, nonspecific ST changes throughout, QTc is 418  Comparison: no previous EKG available    [KG]   2014 Patient felt better on reassessment. [KG]      ED Course User Index  [KG] Helena Stuart DO        Social Determinants affecting Dx or Tx: Patient has poor medical compliance and follow-up with poor medical fluency. Chronic Conditions: Anxiety, depression, seizure disorder    Records Reviewed: EKG from 9/21/2021 reviewed in comparison to EKG today and there are no acute changes. I am the Primary Clinician of Record. CONSULTS: (Who and What was discussed)  None    FINAL IMPRESSION      1.  Acute pharyngitis, unspecified etiology          DISPOSITION/PLAN     DISPOSITION Decision To Discharge 06/24/2023 08:27:48 PM    PATIENT REFERRED TO:  [address] [place] [address] Emergency Department  [address]  [address] [facility] [address]  [phone]  Go to   If symptoms worsen    [facility]) PCP  [phone]  Schedule an appointment as soon as possible for a visit   Establish care      DISCHARGE MEDICATIONS:  Discharge Medication List as of 6/24/2023  8:30 PM               (Please note that portions of this note were completed

## 2023-06-25 LAB
EKG ATRIAL RATE: 104 BPM
EKG P AXIS: 62 DEGREES
EKG P-R INTERVAL: 136 MS
EKG Q-T INTERVAL: 318 MS
EKG QRS DURATION: 76 MS
EKG QTC CALCULATION (BAZETT): 418 MS
EKG R AXIS: 123 DEGREES
EKG T AXIS: 29 DEGREES
EKG VENTRICULAR RATE: 104 BPM

## 2023-06-25 PROCEDURE — 93010 ELECTROCARDIOGRAM REPORT: CPT | Performed by: INTERNAL MEDICINE

## 2023-08-04 ENCOUNTER — APPOINTMENT (OUTPATIENT)
Dept: CT IMAGING | Age: 18
End: 2023-08-04
Payer: COMMERCIAL

## 2023-08-04 ENCOUNTER — HOSPITAL ENCOUNTER (EMERGENCY)
Age: 18
Discharge: HOME | End: 2023-08-04
Attending: EMERGENCY MEDICINE
Payer: COMMERCIAL

## 2023-08-04 VITALS
HEIGHT: 66 IN | BODY MASS INDEX: 24.91 KG/M2 | OXYGEN SATURATION: 96 % | DIASTOLIC BLOOD PRESSURE: 75 MMHG | WEIGHT: 155 LBS | RESPIRATION RATE: 16 BRPM | TEMPERATURE: 98.8 F | SYSTOLIC BLOOD PRESSURE: 135 MMHG | HEART RATE: 86 BPM

## 2023-08-04 DIAGNOSIS — R11.2 NAUSEA VOMITING AND DIARRHEA: Primary | ICD-10-CM

## 2023-08-04 DIAGNOSIS — R10.84 GENERALIZED ABDOMINAL PAIN: ICD-10-CM

## 2023-08-04 DIAGNOSIS — R19.7 NAUSEA VOMITING AND DIARRHEA: Primary | ICD-10-CM

## 2023-08-04 LAB
ALBUMIN SERPL-MCNC: 4.5 G/DL (ref 3.5–5.2)
ALP SERPL-CCNC: 66 U/L (ref 35–104)
ALT SERPL-CCNC: 9 U/L (ref 0–32)
ANION GAP SERPL CALCULATED.3IONS-SCNC: 10 MMOL/L (ref 7–16)
AST SERPL-CCNC: 17 U/L (ref 0–31)
BASOPHILS # BLD: 0.04 K/UL (ref 0–0.2)
BASOPHILS NFR BLD: 1 % (ref 0–2)
BILIRUB DIRECT SERPL-MCNC: <0.2 MG/DL (ref 0–0.3)
BILIRUB INDIRECT SERPL-MCNC: NORMAL MG/DL (ref 0–1)
BILIRUB SERPL-MCNC: 0.7 MG/DL (ref 0–1.2)
BILIRUB UR QL STRIP: NEGATIVE
BUN SERPL-MCNC: 11 MG/DL (ref 6–20)
CALCIUM SERPL-MCNC: 9.4 MG/DL (ref 8.6–10.2)
CHLORIDE SERPL-SCNC: 107 MMOL/L (ref 98–107)
CLARITY UR: CLEAR
CO2 SERPL-SCNC: 24 MMOL/L (ref 22–29)
COLOR UR: YELLOW
COMMENT: NORMAL
CREAT SERPL-MCNC: 1 MG/DL (ref 0.5–1)
EOSINOPHIL # BLD: 0.09 K/UL (ref 0.05–0.5)
EOSINOPHILS RELATIVE PERCENT: 1 % (ref 0–6)
ERYTHROCYTE [DISTWIDTH] IN BLOOD BY AUTOMATED COUNT: 12.9 % (ref 11.5–15)
GFR SERPL CREATININE-BSD FRML MDRD: >60 ML/MIN/1.73M2
GLUCOSE SERPL-MCNC: 94 MG/DL (ref 74–99)
GLUCOSE UR STRIP-MCNC: NEGATIVE MG/DL
HCG, URINE, POC: NEGATIVE
HCT VFR BLD AUTO: 41.6 % (ref 34–48)
HGB BLD-MCNC: 13.7 G/DL (ref 11.5–15.5)
HGB UR QL STRIP.AUTO: NEGATIVE
IMM GRANULOCYTES # BLD AUTO: <0.03 K/UL (ref 0–0.58)
IMM GRANULOCYTES NFR BLD: 0 % (ref 0–5)
KETONES UR STRIP-MCNC: NEGATIVE MG/DL
LACTATE BLDV-SCNC: 1.5 MMOL/L (ref 0.5–2.2)
LEUKOCYTE ESTERASE UR QL STRIP: NEGATIVE
LIPASE SERPL-CCNC: 32 U/L (ref 13–60)
LYMPHOCYTES NFR BLD: 1.89 K/UL (ref 1.5–4)
LYMPHOCYTES RELATIVE PERCENT: 23 % (ref 20–42)
Lab: NORMAL
MCH RBC QN AUTO: 28.4 PG (ref 26–35)
MCHC RBC AUTO-ENTMCNC: 32.9 G/DL (ref 32–34.5)
MCV RBC AUTO: 86.1 FL (ref 80–99.9)
MONOCYTES NFR BLD: 0.69 K/UL (ref 0.1–0.95)
MONOCYTES NFR BLD: 8 % (ref 2–12)
NEGATIVE QC PASS/FAIL: NORMAL
NEUTROPHILS NFR BLD: 68 % (ref 43–80)
NEUTS SEG NFR BLD: 5.68 K/UL (ref 1.8–7.3)
NITRITE UR QL STRIP: NEGATIVE
PH UR STRIP: 6 [PH] (ref 5–9)
PLATELET # BLD AUTO: 261 K/UL (ref 130–450)
PMV BLD AUTO: 10.2 FL (ref 7–12)
POSITIVE QC PASS/FAIL: NORMAL
POTASSIUM SERPL-SCNC: 4 MMOL/L (ref 3.5–5)
PROT SERPL-MCNC: 6.9 G/DL (ref 6.4–8.3)
PROT UR STRIP-MCNC: NEGATIVE MG/DL
RBC # BLD AUTO: 4.83 M/UL (ref 3.5–5.5)
SODIUM SERPL-SCNC: 141 MMOL/L (ref 132–146)
SP GR UR STRIP: 1.02 (ref 1–1.03)
UROBILINOGEN UR STRIP-ACNC: 1 EU/DL (ref 0–1)
WBC OTHER # BLD: 8.4 K/UL (ref 4.5–11.5)

## 2023-08-04 PROCEDURE — 6360000004 HC RX CONTRAST MEDICATION: Performed by: RADIOLOGY

## 2023-08-04 PROCEDURE — 96375 TX/PRO/DX INJ NEW DRUG ADDON: CPT

## 2023-08-04 PROCEDURE — 85025 COMPLETE CBC W/AUTO DIFF WBC: CPT

## 2023-08-04 PROCEDURE — 6360000002 HC RX W HCPCS: Performed by: EMERGENCY MEDICINE

## 2023-08-04 PROCEDURE — 96374 THER/PROPH/DIAG INJ IV PUSH: CPT

## 2023-08-04 PROCEDURE — 83690 ASSAY OF LIPASE: CPT

## 2023-08-04 PROCEDURE — 2500000003 HC RX 250 WO HCPCS: Performed by: EMERGENCY MEDICINE

## 2023-08-04 PROCEDURE — 2580000003 HC RX 258: Performed by: EMERGENCY MEDICINE

## 2023-08-04 PROCEDURE — 2580000003 HC RX 258: Performed by: RADIOLOGY

## 2023-08-04 PROCEDURE — A4216 STERILE WATER/SALINE, 10 ML: HCPCS | Performed by: EMERGENCY MEDICINE

## 2023-08-04 PROCEDURE — 74177 CT ABD & PELVIS W/CONTRAST: CPT

## 2023-08-04 PROCEDURE — 82248 BILIRUBIN DIRECT: CPT

## 2023-08-04 PROCEDURE — 83605 ASSAY OF LACTIC ACID: CPT

## 2023-08-04 PROCEDURE — 99285 EMERGENCY DEPT VISIT HI MDM: CPT

## 2023-08-04 PROCEDURE — 81003 URINALYSIS AUTO W/O SCOPE: CPT

## 2023-08-04 PROCEDURE — 80053 COMPREHEN METABOLIC PANEL: CPT

## 2023-08-04 RX ORDER — 0.9 % SODIUM CHLORIDE 0.9 %
1000 INTRAVENOUS SOLUTION INTRAVENOUS ONCE
Status: COMPLETED | OUTPATIENT
Start: 2023-08-04 | End: 2023-08-04

## 2023-08-04 RX ORDER — SODIUM CHLORIDE 0.9 % (FLUSH) 0.9 %
10 SYRINGE (ML) INJECTION PRN
Status: COMPLETED | OUTPATIENT
Start: 2023-08-04 | End: 2023-08-04

## 2023-08-04 RX ORDER — ONDANSETRON 2 MG/ML
4 INJECTION INTRAMUSCULAR; INTRAVENOUS ONCE
Status: COMPLETED | OUTPATIENT
Start: 2023-08-04 | End: 2023-08-04

## 2023-08-04 RX ORDER — ONDANSETRON 4 MG/1
4 TABLET, FILM COATED ORAL EVERY 8 HOURS PRN
Qty: 20 TABLET | Refills: 0 | Status: SHIPPED | OUTPATIENT
Start: 2023-08-04

## 2023-08-04 RX ORDER — DICYCLOMINE HYDROCHLORIDE 10 MG/1
10 CAPSULE ORAL
Qty: 30 CAPSULE | Refills: 0 | Status: SHIPPED | OUTPATIENT
Start: 2023-08-04

## 2023-08-04 RX ADMIN — ONDANSETRON 4 MG: 2 INJECTION INTRAMUSCULAR; INTRAVENOUS at 18:06

## 2023-08-04 RX ADMIN — SODIUM CHLORIDE 1000 ML: 9 INJECTION, SOLUTION INTRAVENOUS at 18:05

## 2023-08-04 RX ADMIN — FAMOTIDINE 20 MG: 10 INJECTION, SOLUTION INTRAVENOUS at 18:05

## 2023-08-04 RX ADMIN — IOPAMIDOL 75 ML: 755 INJECTION, SOLUTION INTRAVENOUS at 19:08

## 2023-08-04 RX ADMIN — SODIUM CHLORIDE, PRESERVATIVE FREE 10 ML: 5 INJECTION INTRAVENOUS at 19:06

## 2023-08-04 ASSESSMENT — ENCOUNTER SYMPTOMS
SHORTNESS OF BREATH: 0
COLOR CHANGE: 0
NAUSEA: 1
ABDOMINAL PAIN: 1
DIARRHEA: 1
VOMITING: 1
BACK PAIN: 0
PHOTOPHOBIA: 0
COUGH: 0
RHINORRHEA: 0

## 2023-08-04 ASSESSMENT — PAIN DESCRIPTION - LOCATION: LOCATION: ABDOMEN

## 2023-08-04 ASSESSMENT — PAIN - FUNCTIONAL ASSESSMENT: PAIN_FUNCTIONAL_ASSESSMENT: 0-10

## 2023-08-04 ASSESSMENT — PAIN SCALES - GENERAL: PAINLEVEL_OUTOF10: 6

## 2023-08-04 ASSESSMENT — LIFESTYLE VARIABLES
HOW MANY STANDARD DRINKS CONTAINING ALCOHOL DO YOU HAVE ON A TYPICAL DAY: PATIENT DOES NOT DRINK
HOW OFTEN DO YOU HAVE A DRINK CONTAINING ALCOHOL: NEVER

## 2023-08-04 NOTE — ED PROVIDER NOTES
[address]        Pt Name: Duglas Bell  MRN: [id number]  [facility] 2005  Date of evaluation: 8/4/2023  Provider: Prabha Feliz DO  PCP: No primary care provider on file. Note Started: 5:48 PM EDT 8/4/23    CHIEF COMPLAINT       Chief Complaint   Patient presents with    Abdominal Pain     Symptoms started last night. Patient states she is unable to keep anything down. Emesis    Diarrhea       HISTORY OF PRESENT ILLNESS: 1 or more Elements     History from : Patient    Limitations to history : None    Duglas Bell is a 21 y.o. female who presents for abdominal pain, nausea vomiting, diarrhea. Pt notes 2 episodes of watery diarrhea. Pt notes vomited 15 times, no sick contacts, decreased po intake. Pt dneies any cough pt notes her throat is ore from vomiting. Pt feels bloating denies any real abdominal pain. Pt denies any uti sx. No hematochezia or melena. Pain Is 3/62 periumbilical    Nursing Notes were all reviewed and agreed with or any disagreements were addressed in the HPI. REVIEW OF SYSTEMS :      Review of Systems   Constitutional:  Negative for chills and fever. HENT:  Negative for congestion, postnasal drip and rhinorrhea. Eyes:  Negative for photophobia and visual disturbance. Respiratory:  Negative for cough and shortness of breath. Gastrointestinal:  Positive for abdominal pain, diarrhea, nausea and vomiting. Endocrine: Negative for polyuria. Genitourinary:  Negative for dyspareunia and dysuria. Musculoskeletal:  Negative for back pain and neck pain. Skin:  Negative for color change and pallor. Neurological:  Negative for dizziness and headaches. Hematological:  Negative for adenopathy. Does not bruise/bleed easily. Psychiatric/Behavioral:  Negative for agitation. Positives and Pertinent negatives as per HPI.      SURGICAL HISTORY     Past Surgical History: Oropharynx is clear. No posterior oropharyngeal erythema. Eyes:      Extraocular Movements: Extraocular movements intact. Pupils: Pupils are equal, round, and reactive to light. Cardiovascular:      Rate and Rhythm: Normal rate and regular rhythm. Pulses: Normal pulses. Heart sounds: No murmur heard. Pulmonary:      Effort: Pulmonary effort is normal.      Breath sounds: No wheezing or rhonchi. Chest:      Chest wall: No tenderness. Abdominal:      General: Abdomen is flat. Bowel sounds are normal.      Palpations: Abdomen is soft. Tenderness: There is abdominal tenderness in the periumbilical area. There is no right CVA tenderness, left CVA tenderness or guarding. Hernia: No hernia is present. Musculoskeletal:         General: No swelling or deformity. Cervical back: Normal range of motion and neck supple. No muscular tenderness. Skin:     General: Skin is warm and dry. Capillary Refill: Capillary refill takes less than 2 seconds. Coloration: Skin is not mottled. Neurological:      General: No focal deficit present. Mental Status: She is alert and oriented to person, place, and time. Cranial Nerves: No cranial nerve deficit. Psychiatric:         Mood and Affect: Mood normal.           DIAGNOSTIC RESULTS   LABS:    Labs Reviewed   CULTURE, STOOL   BASIC METABOLIC PANEL   HEPATIC FUNCTION PANEL   LACTIC ACID   LIPASE   URINALYSIS   CBC WITH AUTO DIFFERENTIAL   POC PREGNANCY UR-QUAL       When ordered only abnormal lab results are displayed. All other labs were within normal range or not returned as of this dictation.         RADIOLOGY:   Non-plain film images such as CT, Ultrasound and MRI are read by the radiologist. Plain radiographic images are visualized and preliminarily interpreted by the ED Provider with the below findings:        Interpretation per the Radiologist below, if available at the time of this note:    [address] [place] [address]

## 2023-08-05 NOTE — DISCHARGE INSTRUCTIONS
Call family doctor tomorrow and schedule a followup appointment to be seen in 2 days    Have your doctor obtain  finalized report of all testing    Increase your fluid intake. Pedialyte or Gatorade    Results for orders placed or performed during the hospital encounter of 08/04/23   BMP   Result Value Ref Range    Glucose 94 74 - 99 mg/dL    BUN 11 6 - 20 mg/dL    Creatinine 1.0 0.50 - 1.00 mg/dL    Est, Glom Filt Rate >60 >60 mL/min/1.73m2    Calcium 9.4 8.6 - 10.2 mg/dL    Sodium 141 132 - 146 mmol/L    Potassium 4.0 3.5 - 5.0 mmol/L    Chloride 107 98 - 107 mmol/L    CO2 24 22 - 29 mmol/L    Anion Gap 10 7 - 16 mmol/L   Hepatic Function Panel   Result Value Ref Range    Albumin 4.5 3.5 - 5.2 g/dL    Alkaline Phosphatase 66 35 - 104 U/L    ALT 9 0 - 32 U/L    AST 17 0 - 31 U/L    Total Bilirubin 0.7 0.0 - 1.2 mg/dL    Bilirubin, Direct <0.2 0.0 - 0.3 mg/dL    Bilirubin, Indirect Can not be calculated 0.0 - 1.0 mg/dL    Total Protein 6.9 6.4 - 8.3 g/dL   Lactic Acid   Result Value Ref Range    Lactic Acid 1.5 0.5 - 2.2 mmol/L   Lipase   Result Value Ref Range    Lipase 32 13 - 60 U/L   Urinalysis   Result Value Ref Range    Color, UA Yellow Yellow    Turbidity UA Clear Clear    Glucose, Ur NEGATIVE NEGATIVE mg/dL    Bilirubin Urine NEGATIVE NEGATIVE    Ketones, Urine NEGATIVE NEGATIVE mg/dL    Specific Gravity, UA 1.025 1.005 - 1.030    Urine Hgb NEGATIVE NEGATIVE    pH, UA 6.0 5.0 - 9.0    Protein, UA NEGATIVE NEGATIVE mg/dL    Urobilinogen, Urine 1.0 0.0 - 1.0 EU/dL    Nitrite, Urine NEGATIVE NEGATIVE    Leukocyte Esterase, Urine NEGATIVE NEGATIVE    Comment       Microscopic exam not performed based on chemical results unless requested in original order.    CBC with Auto Differential   Result Value Ref Range    WBC 8.4 4.5 - 11.5 k/uL    RBC 4.83 3.50 - 5.50 m/uL    Hemoglobin 13.7 11.5 - 15.5 g/dL    Hematocrit 41.6 34.0 - 48.0 %    MCV 86.1 80.0 - 99.9 fL    MCH 28.4 26.0 - 35.0 pg    MCHC 32.9 32.0 - 34.5 g/dL

## 2023-09-25 ENCOUNTER — OFFICE VISIT (OUTPATIENT)
Dept: FAMILY MEDICINE CLINIC | Age: 18
End: 2023-09-25
Payer: COMMERCIAL

## 2023-09-25 VITALS
DIASTOLIC BLOOD PRESSURE: 80 MMHG | WEIGHT: 143.2 LBS | BODY MASS INDEX: 23.11 KG/M2 | TEMPERATURE: 97.8 F | SYSTOLIC BLOOD PRESSURE: 122 MMHG | OXYGEN SATURATION: 99 % | HEART RATE: 79 BPM

## 2023-09-25 DIAGNOSIS — S99.922A INJURY OF LEFT ANKLE AND FOOT, INITIAL ENCOUNTER: Primary | ICD-10-CM

## 2023-09-25 DIAGNOSIS — S99.912A INJURY OF LEFT ANKLE AND FOOT, INITIAL ENCOUNTER: Primary | ICD-10-CM

## 2023-09-25 PROCEDURE — 99214 OFFICE O/P EST MOD 30 MIN: CPT | Performed by: PHYSICIAN ASSISTANT

## 2023-09-25 PROCEDURE — 1036F TOBACCO NON-USER: CPT | Performed by: PHYSICIAN ASSISTANT

## 2023-09-25 PROCEDURE — L4387 NON-PNEUM WALK BOOT PRE OTS: HCPCS | Performed by: PHYSICIAN ASSISTANT

## 2023-09-25 PROCEDURE — G8420 CALC BMI NORM PARAMETERS: HCPCS | Performed by: PHYSICIAN ASSISTANT

## 2023-09-25 PROCEDURE — G8427 DOCREV CUR MEDS BY ELIG CLIN: HCPCS | Performed by: PHYSICIAN ASSISTANT

## 2023-09-25 NOTE — PROGRESS NOTES
go directly to the emergency department. Clinical Impression:   Estee Bryant was seen today for ankle injury. Diagnoses and all orders for this visit:    Injury of left ankle and foot, initial encounter  -     XR ANKLE LEFT (MIN 3 VIEWS); Future  -     XR FOOT LEFT (MIN 3 VIEWS); Future  -     MI NON-PNEUM WALK BOOT PRE OTS        The patient is to call for any concerns or return if any of the signs or symptoms worsen. The patient is to follow-up with PCP in the next 2-3 days for repeat evaluation repeat assessment or go directly to the emergency department.      SIGNATURE: Cande Cardenas III, PA-C

## 2024-02-16 ENCOUNTER — TELEPHONE (OUTPATIENT)
Dept: PRIMARY CARE CLINIC | Age: 19
End: 2024-02-16

## 2024-02-21 ENCOUNTER — APPOINTMENT (OUTPATIENT)
Dept: GENERAL RADIOLOGY | Age: 19
End: 2024-02-21
Payer: COMMERCIAL

## 2024-02-21 ENCOUNTER — OFFICE VISIT (OUTPATIENT)
Dept: PRIMARY CARE CLINIC | Age: 19
End: 2024-02-21
Payer: COMMERCIAL

## 2024-02-21 ENCOUNTER — APPOINTMENT (OUTPATIENT)
Dept: CT IMAGING | Age: 19
End: 2024-02-21
Payer: COMMERCIAL

## 2024-02-21 ENCOUNTER — HOSPITAL ENCOUNTER (EMERGENCY)
Age: 19
Discharge: HOME | End: 2024-02-21
Attending: EMERGENCY MEDICINE
Payer: COMMERCIAL

## 2024-02-21 VITALS
SYSTOLIC BLOOD PRESSURE: 117 MMHG | TEMPERATURE: 98.5 F | BODY MASS INDEX: 21.69 KG/M2 | HEIGHT: 66 IN | HEART RATE: 78 BPM | OXYGEN SATURATION: 100 % | WEIGHT: 135 LBS | DIASTOLIC BLOOD PRESSURE: 67 MMHG | RESPIRATION RATE: 16 BRPM

## 2024-02-21 VITALS
TEMPERATURE: 97.9 F | OXYGEN SATURATION: 97 % | BODY MASS INDEX: 21.69 KG/M2 | HEIGHT: 66 IN | SYSTOLIC BLOOD PRESSURE: 122 MMHG | DIASTOLIC BLOOD PRESSURE: 68 MMHG | WEIGHT: 135 LBS | HEART RATE: 122 BPM

## 2024-02-21 DIAGNOSIS — R06.02 SOB (SHORTNESS OF BREATH): ICD-10-CM

## 2024-02-21 DIAGNOSIS — J06.9 UPPER RESPIRATORY TRACT INFECTION, UNSPECIFIED TYPE: Primary | ICD-10-CM

## 2024-02-21 DIAGNOSIS — R06.09 DYSPNEA ON EXERTION: ICD-10-CM

## 2024-02-21 DIAGNOSIS — J45.20 ASTHMA IN ADULT, MILD INTERMITTENT, UNCOMPLICATED: Primary | ICD-10-CM

## 2024-02-21 DIAGNOSIS — R00.0 TACHYCARDIA: ICD-10-CM

## 2024-02-21 LAB
ALBUMIN SERPL-MCNC: 4.4 G/DL (ref 3.5–5.2)
ALP SERPL-CCNC: 71 U/L (ref 35–104)
ALT SERPL-CCNC: 16 U/L (ref 0–32)
ANION GAP SERPL CALCULATED.3IONS-SCNC: 11 MMOL/L (ref 7–16)
AST SERPL-CCNC: 24 U/L (ref 0–31)
BASOPHILS # BLD: 0.03 K/UL (ref 0–0.2)
BASOPHILS NFR BLD: 1 % (ref 0–2)
BILIRUB SERPL-MCNC: 0.7 MG/DL (ref 0–1.2)
BNP SERPL-MCNC: <36 PG/ML (ref 0–125)
BUN SERPL-MCNC: 11 MG/DL (ref 6–20)
CALCIUM SERPL-MCNC: 9.5 MG/DL (ref 8.6–10.2)
CHLORIDE SERPL-SCNC: 103 MMOL/L (ref 98–107)
CO2 SERPL-SCNC: 25 MMOL/L (ref 22–29)
CREAT SERPL-MCNC: 0.9 MG/DL (ref 0.5–1)
D DIMER: 205 NG/ML DDU (ref 0–232)
EOSINOPHIL # BLD: 0.1 K/UL (ref 0.05–0.5)
EOSINOPHILS RELATIVE PERCENT: 2 % (ref 0–6)
ERYTHROCYTE [DISTWIDTH] IN BLOOD BY AUTOMATED COUNT: 12.6 % (ref 11.5–15)
GFR SERPL CREATININE-BSD FRML MDRD: >60 ML/MIN/1.73M2
GLUCOSE SERPL-MCNC: 90 MG/DL (ref 74–99)
HCG, URINE, POC: NEGATIVE
HCT VFR BLD AUTO: 41.6 % (ref 34–48)
HGB BLD-MCNC: 14.1 G/DL (ref 11.5–15.5)
IMM GRANULOCYTES # BLD AUTO: <0.03 K/UL (ref 0–0.58)
IMM GRANULOCYTES NFR BLD: 0 % (ref 0–5)
INFLUENZA A BY PCR: NOT DETECTED
INFLUENZA B BY PCR: NOT DETECTED
LYMPHOCYTES NFR BLD: 1.99 K/UL (ref 1.5–4)
LYMPHOCYTES RELATIVE PERCENT: 34 % (ref 20–42)
Lab: NORMAL
MCH RBC QN AUTO: 28.2 PG (ref 26–35)
MCHC RBC AUTO-ENTMCNC: 33.9 G/DL (ref 32–34.5)
MCV RBC AUTO: 83.2 FL (ref 80–99.9)
MONOCYTES NFR BLD: 0.44 K/UL (ref 0.1–0.95)
MONOCYTES NFR BLD: 8 % (ref 2–12)
NEGATIVE QC PASS/FAIL: NORMAL
NEUTROPHILS NFR BLD: 56 % (ref 43–80)
NEUTS SEG NFR BLD: 3.29 K/UL (ref 1.8–7.3)
PLATELET # BLD AUTO: 247 K/UL (ref 130–450)
PMV BLD AUTO: 10.6 FL (ref 7–12)
POSITIVE QC PASS/FAIL: NORMAL
POTASSIUM SERPL-SCNC: 3.9 MMOL/L (ref 3.5–5)
PROT SERPL-MCNC: 7.4 G/DL (ref 6.4–8.3)
RBC # BLD AUTO: 5 M/UL (ref 3.5–5.5)
RSV BY PCR: NOT DETECTED
SARS-COV-2 RDRP RESP QL NAA+PROBE: NOT DETECTED
SODIUM SERPL-SCNC: 139 MMOL/L (ref 132–146)
SPECIMEN DESCRIPTION: NORMAL
SPECIMEN SOURCE: NORMAL
TROPONIN I SERPL HS-MCNC: <6 NG/L (ref 0–9)
WBC OTHER # BLD: 5.9 K/UL (ref 4.5–11.5)

## 2024-02-21 PROCEDURE — G8484 FLU IMMUNIZE NO ADMIN: HCPCS | Performed by: FAMILY MEDICINE

## 2024-02-21 PROCEDURE — 85025 COMPLETE CBC W/AUTO DIFF WBC: CPT

## 2024-02-21 PROCEDURE — 94664 DEMO&/EVAL PT USE INHALER: CPT

## 2024-02-21 PROCEDURE — 6370000000 HC RX 637 (ALT 250 FOR IP): Performed by: EMERGENCY MEDICINE

## 2024-02-21 PROCEDURE — 93005 ELECTROCARDIOGRAM TRACING: CPT | Performed by: EMERGENCY MEDICINE

## 2024-02-21 PROCEDURE — 87635 SARS-COV-2 COVID-19 AMP PRB: CPT

## 2024-02-21 PROCEDURE — 6360000002 HC RX W HCPCS: Performed by: EMERGENCY MEDICINE

## 2024-02-21 PROCEDURE — G8420 CALC BMI NORM PARAMETERS: HCPCS | Performed by: FAMILY MEDICINE

## 2024-02-21 PROCEDURE — 71275 CT ANGIOGRAPHY CHEST: CPT

## 2024-02-21 PROCEDURE — 83880 ASSAY OF NATRIURETIC PEPTIDE: CPT

## 2024-02-21 PROCEDURE — 99213 OFFICE O/P EST LOW 20 MIN: CPT | Performed by: FAMILY MEDICINE

## 2024-02-21 PROCEDURE — 80053 COMPREHEN METABOLIC PANEL: CPT

## 2024-02-21 PROCEDURE — G8428 CUR MEDS NOT DOCUMENT: HCPCS | Performed by: FAMILY MEDICINE

## 2024-02-21 PROCEDURE — 71045 X-RAY EXAM CHEST 1 VIEW: CPT

## 2024-02-21 PROCEDURE — 96374 THER/PROPH/DIAG INJ IV PUSH: CPT

## 2024-02-21 PROCEDURE — 1036F TOBACCO NON-USER: CPT | Performed by: FAMILY MEDICINE

## 2024-02-21 PROCEDURE — 99285 EMERGENCY DEPT VISIT HI MDM: CPT

## 2024-02-21 PROCEDURE — 84484 ASSAY OF TROPONIN QUANT: CPT

## 2024-02-21 PROCEDURE — 87634 RSV DNA/RNA AMP PROBE: CPT

## 2024-02-21 PROCEDURE — 6360000004 HC RX CONTRAST MEDICATION: Performed by: RADIOLOGY

## 2024-02-21 PROCEDURE — 87502 INFLUENZA DNA AMP PROBE: CPT

## 2024-02-21 PROCEDURE — 85379 FIBRIN DEGRADATION QUANT: CPT

## 2024-02-21 RX ORDER — METHYLPREDNISOLONE SODIUM SUCCINATE 125 MG/2ML
125 INJECTION, POWDER, LYOPHILIZED, FOR SOLUTION INTRAMUSCULAR; INTRAVENOUS ONCE
Status: COMPLETED | OUTPATIENT
Start: 2024-02-21 | End: 2024-02-21

## 2024-02-21 RX ORDER — PREDNISONE 20 MG/1
20 TABLET ORAL 2 TIMES DAILY
Qty: 10 TABLET | Refills: 0 | Status: SHIPPED | OUTPATIENT
Start: 2024-02-21 | End: 2024-02-26

## 2024-02-21 RX ORDER — IPRATROPIUM BROMIDE AND ALBUTEROL SULFATE 2.5; .5 MG/3ML; MG/3ML
1 SOLUTION RESPIRATORY (INHALATION) ONCE
Status: COMPLETED | OUTPATIENT
Start: 2024-02-21 | End: 2024-02-21

## 2024-02-21 RX ORDER — ALBUTEROL SULFATE 90 UG/1
2 AEROSOL, METERED RESPIRATORY (INHALATION) 4 TIMES DAILY PRN
Qty: 18 G | Refills: 0 | Status: SHIPPED | OUTPATIENT
Start: 2024-02-21

## 2024-02-21 RX ADMIN — IPRATROPIUM BROMIDE AND ALBUTEROL SULFATE 1 DOSE: 2.5; .5 SOLUTION RESPIRATORY (INHALATION) at 18:31

## 2024-02-21 RX ADMIN — IOPAMIDOL 75 ML: 755 INJECTION, SOLUTION INTRAVENOUS at 20:32

## 2024-02-21 RX ADMIN — METHYLPREDNISOLONE SODIUM SUCCINATE 125 MG: 125 INJECTION INTRAMUSCULAR; INTRAVENOUS at 20:00

## 2024-02-21 SDOH — ECONOMIC STABILITY: HOUSING INSECURITY
IN THE LAST 12 MONTHS, WAS THERE A TIME WHEN YOU DID NOT HAVE A STEADY PLACE TO SLEEP OR SLEPT IN A SHELTER (INCLUDING NOW)?: NO

## 2024-02-21 SDOH — ECONOMIC STABILITY: FOOD INSECURITY: WITHIN THE PAST 12 MONTHS, THE FOOD YOU BOUGHT JUST DIDN'T LAST AND YOU DIDN'T HAVE MONEY TO GET MORE.: NEVER TRUE

## 2024-02-21 SDOH — ECONOMIC STABILITY: TRANSPORTATION INSECURITY
IN THE PAST 12 MONTHS, HAS LACK OF TRANSPORTATION KEPT YOU FROM MEETINGS, WORK, OR FROM GETTING THINGS NEEDED FOR DAILY LIVING?: NO

## 2024-02-21 SDOH — ECONOMIC STABILITY: FOOD INSECURITY: WITHIN THE PAST 12 MONTHS, YOU WORRIED THAT YOUR FOOD WOULD RUN OUT BEFORE YOU GOT MONEY TO BUY MORE.: NEVER TRUE

## 2024-02-21 SDOH — ECONOMIC STABILITY: INCOME INSECURITY: HOW HARD IS IT FOR YOU TO PAY FOR THE VERY BASICS LIKE FOOD, HOUSING, MEDICAL CARE, AND HEATING?: NOT HARD AT ALL

## 2024-02-21 ASSESSMENT — PATIENT HEALTH QUESTIONNAIRE - PHQ9
SUM OF ALL RESPONSES TO PHQ QUESTIONS 1-9: 0
1. LITTLE INTEREST OR PLEASURE IN DOING THINGS: 0
SUM OF ALL RESPONSES TO PHQ QUESTIONS 1-9: 0
2. FEELING DOWN, DEPRESSED OR HOPELESS: 0
2. FEELING DOWN, DEPRESSED OR HOPELESS: NOT AT ALL
1. LITTLE INTEREST OR PLEASURE IN DOING THINGS: NOT AT ALL
SUM OF ALL RESPONSES TO PHQ QUESTIONS 1-9: 0
SUM OF ALL RESPONSES TO PHQ QUESTIONS 1-9: 0
SUM OF ALL RESPONSES TO PHQ9 QUESTIONS 1 & 2: 0
SUM OF ALL RESPONSES TO PHQ9 QUESTIONS 1 & 2: 0

## 2024-02-21 ASSESSMENT — PAIN DESCRIPTION - DESCRIPTORS: DESCRIPTORS: DISCOMFORT

## 2024-02-21 ASSESSMENT — PAIN DESCRIPTION - ORIENTATION: ORIENTATION: RIGHT;MID

## 2024-02-21 ASSESSMENT — PAIN DESCRIPTION - PAIN TYPE: TYPE: ACUTE PAIN

## 2024-02-21 ASSESSMENT — PAIN SCALES - GENERAL: PAINLEVEL_OUTOF10: 7

## 2024-02-21 ASSESSMENT — ENCOUNTER SYMPTOMS
GASTROINTESTINAL NEGATIVE: 1
WHEEZING: 1
ALLERGIC/IMMUNOLOGIC NEGATIVE: 1
SHORTNESS OF BREATH: 0
EYES NEGATIVE: 1

## 2024-02-21 ASSESSMENT — PAIN DESCRIPTION - FREQUENCY: FREQUENCY: CONTINUOUS

## 2024-02-21 ASSESSMENT — PAIN - FUNCTIONAL ASSESSMENT: PAIN_FUNCTIONAL_ASSESSMENT: 0-10

## 2024-02-21 ASSESSMENT — PAIN DESCRIPTION - ONSET: ONSET: ON-GOING

## 2024-02-21 NOTE — ED PROVIDER NOTES
[facility] EMERGENCY DEPARTMENT  EMERGENCY DEPARTMENT ENCOUNTER        Pt Name: Gael Montgomery  MRN: [id number]  Birthdate 2005  Date of evaluation: 2/21/2024  Provider: Claribel Santiago MD  PCP: Jaiden Mccullough DO  Note Started: 5:48 PM EST 2/21/24    CHIEF COMPLAINT       Chief Complaint   Patient presents with    Shortness of Breath     X3 days, saw  today who sent her in for possible blood clot       HISTORY OF PRESENT ILLNESS: 1 or more Elements   History From:  patient    Limitations to history : None    Gael Montgomery is a 18 y.o. female who presents to the emergency department with complaints of increasing shortness of breath on exertion.  She states has been sick for about the past week.  She had mostly nasal congestion and slight dry cough.  She has no history of asthma but has had bronchospasm in the past and uses inhalers as needed as needed with respiratory infections.  States that her congestion has improved for the past couple days she has noticed walking up steps that she gets more winded.  No chest pain no pleuritic pain just increased tightness with deep breathing.  She saw her PCP and sent her in to rule out for PE.  No history of blood clots no leg pain no leg swelling.  No hypoxia on arrival.  No recent travel.  No fevers or chills.    Nursing Notes were all reviewed and agreed with or any disagreements were addressed in the HPI.      REVIEW OF EXTERNAL NOTE :       PDMP reviewed    REVIEW OF SYSTEMS :           Positives and Pertinent negatives as per HPI.     SURGICAL HISTORY     Past Surgical History:   Procedure Laterality Date    APPENDECTOMY         CURRENTMEDICATIONS       Discharge Medication List as of 2/21/2024  9:55 PM          ALLERGIES     Patient has no known allergies.    FAMILYHISTORY     No family history on file.     SOCIAL HISTORY       Social History     Tobacco Use    Smoking status: Never    Smokeless tobacco: Never

## 2024-02-21 NOTE — PROGRESS NOTES
24  Name: Gael Montgomery    : 2005    Sex: female    Age: 18 y.o.    Wheezing     Subjective:  Chief Complaint: Patient is here for wheezing     For one week-no cough   sl sinus       Denies smoking or vaping  No  t  sw ch  no cp  or sob  No calf pain  no leg swleing  On  bcp  She used inalerh  ast pm and again    7 am tody and not make a differnce     did inc  hr   for anohr     goes upa  grea tdeal here with walk  When walk down hallway went to  142 HR        Review of Systems   Constitutional: Negative.    HENT: Negative.     Eyes: Negative.    Respiratory:  Positive for wheezing. Negative for shortness of breath.    Cardiovascular: Negative.  Negative for chest pain, palpitations and leg swelling.   Gastrointestinal: Negative.    Endocrine: Negative.    Genitourinary: Negative.    Musculoskeletal: Negative.    Skin: Negative.    Allergic/Immunologic: Negative.    Neurological: Negative.    Hematological: Negative.    Psychiatric/Behavioral: Negative.         No current outpatient medications on file.  No Known Allergies  Social History     Socioeconomic History    Marital status: Single     Spouse name: Not on file    Number of children: Not on file    Years of education: Not on file    Highest education level: Not on file   Occupational History    Not on file   Tobacco Use    Smoking status: Never    Smokeless tobacco: Never   Vaping Use    Vaping Use: Never used   Substance and Sexual Activity    Alcohol use: Never    Drug use: Never    Sexual activity: Yes   Other Topics Concern    Not on file   Social History Narrative    ER at 10-20 with left lower quadrant abdominal pain.  Large ovarian cyst.  Seeing GYN Rolf Ballesteros, referred to Dr. Stevens for pain.  Urinary retention seen in ER with 300 cc on catheter and referred to urology by the ER for 10-28    Seizure disorder    Left temporal cavernoma sees [facility]    Anxiety depression sees psychiatry    Chronic constipation

## 2024-02-21 NOTE — ED TRIAGE NOTES
Department of Emergency Medicine    FIRST PROVIDER TRIAGE NOTE             Independent MLP           2/21/24  4:40 PM EST    Date of Encounter: 2/21/24   MRN: [id number]    Vitals:    02/21/24 1646   BP: (!) 134/96   Pulse: 93   Resp: 18   Temp: 98.5 °F (36.9 °C)   TempSrc: Oral   SpO2: 98%   Weight: 61.2 kg (135 lb)   Height: 1.676 m (5' 6\")      HPI: Gael Montgomery is a 18 y.o. female who presents to the ED for Shortness of Breath (X3 days, saw  today who sent her in for possible blood clot)    Also reports chest pain   SOB worse with exertion  Also reports lightheadedness      No history of PE  Hx PE      ROS: Negative for abd pain, fever, vomiting, or headache.    Physical Exam:   Gen Appearance/Constitutional: alert  CV: tachycardia     Initial Plan of Care: All treatment areas with department are currently occupied.     Plan to order/Initiate the following while awaiting opening in ED: Triage evaluation.    Initial Plan of Care: Initiate Treatment-Testing, Proceed toTreatment Area When Bed Available for ED Attending/MLP to Continue Care    Electronically signed by Angy Adams PA-C   DD: 2/21/24

## 2024-02-22 LAB
EKG ATRIAL RATE: 76 BPM
EKG P AXIS: 65 DEGREES
EKG P-R INTERVAL: 134 MS
EKG Q-T INTERVAL: 386 MS
EKG QRS DURATION: 86 MS
EKG QTC CALCULATION (BAZETT): 434 MS
EKG R AXIS: 110 DEGREES
EKG T AXIS: 38 DEGREES
EKG VENTRICULAR RATE: 76 BPM

## 2024-02-22 PROCEDURE — 93010 ELECTROCARDIOGRAM REPORT: CPT | Performed by: INTERNAL MEDICINE

## 2024-02-23 ENCOUNTER — TELEPHONE (OUTPATIENT)
Dept: PRIMARY CARE CLINIC | Age: 19
End: 2024-02-23

## 2024-02-23 NOTE — TELEPHONE ENCOUNTER
----- Message from Tabby Lynn sent at 2/22/2024  3:56 PM EST -----  Subject: Message to Provider    QUESTIONS  Information for Provider? Pt is current to the practice but is looking to   establish care with Dr. Howard. Please advise if she can switch. Her   parents are current patients of his.   ---------------------------------------------------------------------------  --------------  CALL BACK INFO  [phone]; OK to leave message on voicemail  ---------------------------------------------------------------------------  --------------  SCRIPT ANSWERS  Relationship to Patient? Self

## 2024-02-23 NOTE — TELEPHONE ENCOUNTER
----- Message from Tabby Lynn sent at 2/22/2024  3:56 PM EST -----  Subject: Message to Provider    QUESTIONS  Information for Provider? Pt is current to the practice but is looking to   establish care with Dr. Howard. Please advise if she can switch. Her   parents are current patients of his.   ---------------------------------------------------------------------------  --------------  CALL BACK INFO  [phone]; OK to leave message on voicemail  ---------------------------------------------------------------------------  --------------  SCRIPT ANSWERS  Relationship to Patient? Self

## 2024-03-11 SDOH — HEALTH STABILITY: PHYSICAL HEALTH: ON AVERAGE, HOW MANY MINUTES DO YOU ENGAGE IN EXERCISE AT THIS LEVEL?: 10 MIN

## 2024-03-11 SDOH — HEALTH STABILITY: PHYSICAL HEALTH: ON AVERAGE, HOW MANY DAYS PER WEEK DO YOU ENGAGE IN MODERATE TO STRENUOUS EXERCISE (LIKE A BRISK WALK)?: 1 DAY

## 2024-03-14 ENCOUNTER — OFFICE VISIT (OUTPATIENT)
Dept: PRIMARY CARE CLINIC | Age: 19
End: 2024-03-14
Payer: COMMERCIAL

## 2024-03-14 VITALS
WEIGHT: 134 LBS | HEART RATE: 84 BPM | OXYGEN SATURATION: 98 % | RESPIRATION RATE: 16 BRPM | HEIGHT: 66 IN | BODY MASS INDEX: 21.53 KG/M2 | DIASTOLIC BLOOD PRESSURE: 76 MMHG | SYSTOLIC BLOOD PRESSURE: 132 MMHG

## 2024-03-14 DIAGNOSIS — Z00.01 ENCOUNTER FOR GENERAL ADULT MEDICAL EXAMINATION WITH ABNORMAL FINDINGS: Primary | ICD-10-CM

## 2024-03-14 DIAGNOSIS — J45.990 EXERCISE INDUCED BRONCHOSPASM: ICD-10-CM

## 2024-03-14 DIAGNOSIS — G43.109 MIGRAINE WITH AURA AND WITHOUT STATUS MIGRAINOSUS, NOT INTRACTABLE: ICD-10-CM

## 2024-03-14 PROBLEM — N83.202 CYST OF LEFT OVARY: Status: RESOLVED | Noted: 2022-10-26 | Resolved: 2024-03-14

## 2024-03-14 PROBLEM — R10.32 LEFT LOWER QUADRANT ABDOMINAL PAIN: Status: RESOLVED | Noted: 2022-10-26 | Resolved: 2024-03-14

## 2024-03-14 PROBLEM — F41.9 ANXIETY: Status: RESOLVED | Noted: 2022-10-26 | Resolved: 2024-03-14

## 2024-03-14 PROCEDURE — G8484 FLU IMMUNIZE NO ADMIN: HCPCS | Performed by: FAMILY MEDICINE

## 2024-03-14 PROCEDURE — 99385 PREV VISIT NEW AGE 18-39: CPT | Performed by: FAMILY MEDICINE

## 2024-03-14 RX ORDER — ALBUTEROL SULFATE 90 UG/1
2 POWDER, METERED RESPIRATORY (INHALATION) EVERY 4 HOURS PRN
Qty: 1 EACH | Refills: 2 | Status: SHIPPED | OUTPATIENT
Start: 2024-03-14

## 2024-03-14 RX ORDER — NORGESTIMATE AND ETHINYL ESTRADIOL 0.25-0.035
1 KIT ORAL DAILY
COMMUNITY

## 2024-03-14 RX ORDER — SUMATRIPTAN 50 MG/1
50 TABLET, FILM COATED ORAL DAILY PRN
Qty: 9 TABLET | Refills: 5 | Status: SHIPPED | OUTPATIENT
Start: 2024-03-14

## 2024-03-14 ASSESSMENT — ENCOUNTER SYMPTOMS
BACK PAIN: 0
SWOLLEN GLANDS: 0
EYE REDNESS: 0
RHINORRHEA: 0
VOMITING: 0
BLOOD IN STOOL: 0
COLOR CHANGE: 0
APNEA: 0
CHEST TIGHTNESS: 0
NAUSEA: 1
WHEEZING: 0
CONSTIPATION: 0
SINUS PRESSURE: 0
PHOTOPHOBIA: 1
COUGH: 0
EYE ITCHING: 0
ABDOMINAL PAIN: 0
SORE THROAT: 0
SHORTNESS OF BREATH: 0
EYE PAIN: 0
DIARRHEA: 0
VISUAL CHANGE: 0

## 2024-03-14 ASSESSMENT — PATIENT HEALTH QUESTIONNAIRE - PHQ9
1. LITTLE INTEREST OR PLEASURE IN DOING THINGS: 0
2. FEELING DOWN, DEPRESSED OR HOPELESS: 0
SUM OF ALL RESPONSES TO PHQ QUESTIONS 1-9: 0
SUM OF ALL RESPONSES TO PHQ9 QUESTIONS 1 & 2: 0
SUM OF ALL RESPONSES TO PHQ QUESTIONS 1-9: 0

## 2024-03-14 NOTE — PROGRESS NOTES
Chief Complaint:     Chief Complaint   Patient presents with    Established New Doctor         Migraine   This is a recurrent problem. The current episode started more than 1 year ago. The problem occurs monthly. The problem has been waxing and waning. The pain does not radiate. The pain quality is similar to prior headaches. Associated symptoms include nausea, phonophobia and photophobia. Pertinent negatives include no abdominal pain, back pain, coughing, dizziness, drainage, ear pain, eye pain, eye redness, fever, hearing loss, neck pain, numbness, rhinorrhea, sinus pressure, sore throat, swollen glands, tingling, tinnitus, visual change, vomiting, weakness or weight loss. Nothing aggravates the symptoms. She has tried nothing for the symptoms. The treatment provided no relief.           Patient Active Problem List   Diagnosis    Seizure disorder (HCC)    Exercise induced bronchospasm    Migraine with aura and without status migrainosus, not intractable       Past Medical History:   Diagnosis Date    Anxiety     Constipation     Depression     Metabolic syndrome     UTI (urinary tract infection)     patient gets frequently        Past Surgical History:   Procedure Laterality Date    APPENDECTOMY         Current Outpatient Medications   Medication Sig Dispense Refill    norgestimate-ethinyl estradiol (RAD) 0.25-35 MG-MCG per tablet Take 1 tablet by mouth daily      albuterol sulfate (PROAIR RESPICLICK) 108 (90 Base) MCG/ACT aerosol powder inhalation Inhale 2 puffs into the lungs every 4 hours as needed for Wheezing or Shortness of Breath 1 each 2    SUMAtriptan (IMITREX) 50 MG tablet Take 1 tablet by mouth daily as needed for Migraine 9 tablet 5     No current facility-administered medications for this visit.       No Known Allergies    Social History     Socioeconomic History    Marital status: Single     Spouse name: None    Number of children: None    Years of education: None    Highest education level: None

## 2024-04-25 ENCOUNTER — TELEPHONE (OUTPATIENT)
Dept: PRIMARY CARE CLINIC | Age: 19
End: 2024-04-25

## 2024-04-25 NOTE — TELEPHONE ENCOUNTER
----- Message from Ruth Delgado sent at 4/25/2024  9:19 AM EDT -----  Subject: Message to Provider    QUESTIONS  Information for Provider? Pt states she had blood work done today, 4/25   and the lab sent the results here. She needs it to go to another office.   Please fax to [phone]. Just the T4, TSH.  ---------------------------------------------------------------------------  --------------  CALL BACK INFO  [phone]; OK to leave message on voicemail  ---------------------------------------------------------------------------  --------------  SCRIPT ANSWERS  Relationship to Patient? Self

## 2024-07-26 RX ORDER — ALBUTEROL SULFATE 90 UG/1
2 AEROSOL, METERED RESPIRATORY (INHALATION) EVERY 6 HOURS PRN
Qty: 18 G | Refills: 3 | Status: SHIPPED | OUTPATIENT
Start: 2024-07-26

## 2024-07-30 ENCOUNTER — OFFICE VISIT (OUTPATIENT)
Dept: FAMILY MEDICINE CLINIC | Age: 19
End: 2024-07-30
Payer: COMMERCIAL

## 2024-07-30 VITALS
SYSTOLIC BLOOD PRESSURE: 110 MMHG | BODY MASS INDEX: 21.11 KG/M2 | DIASTOLIC BLOOD PRESSURE: 70 MMHG | WEIGHT: 130.8 LBS | HEART RATE: 98 BPM | OXYGEN SATURATION: 99 % | TEMPERATURE: 98.6 F

## 2024-07-30 DIAGNOSIS — R05.9 COUGH, UNSPECIFIED TYPE: Primary | ICD-10-CM

## 2024-07-30 DIAGNOSIS — U07.1 COVID-19: ICD-10-CM

## 2024-07-30 PROCEDURE — 1036F TOBACCO NON-USER: CPT | Performed by: PHYSICIAN ASSISTANT

## 2024-07-30 PROCEDURE — 3006F CXR DOC REV: CPT | Performed by: PHYSICIAN ASSISTANT

## 2024-07-30 PROCEDURE — G8420 CALC BMI NORM PARAMETERS: HCPCS | Performed by: PHYSICIAN ASSISTANT

## 2024-07-30 PROCEDURE — 99213 OFFICE O/P EST LOW 20 MIN: CPT | Performed by: PHYSICIAN ASSISTANT

## 2024-07-30 PROCEDURE — G8427 DOCREV CUR MEDS BY ELIG CLIN: HCPCS | Performed by: PHYSICIAN ASSISTANT

## 2024-07-30 NOTE — PROGRESS NOTES
tablet by mouth daily, Disp: , Rfl:     albuterol sulfate (PROAIR RESPICLICK) 108 (90 Base) MCG/ACT aerosol powder inhalation, Inhale 2 puffs into the lungs every 4 hours as needed for Wheezing or Shortness of Breath, Disp: 1 each, Rfl: 2    SUMAtriptan (IMITREX) 50 MG tablet, Take 1 tablet by mouth daily as needed for Migraine, Disp: 9 tablet, Rfl: 5    Allergies:   No Known Allergies    Social History:     Social History     Tobacco Use    Smoking status: Never    Smokeless tobacco: Never   Vaping Use    Vaping Use: Never used   Substance Use Topics    Alcohol use: Never    Drug use: Never       Patient lives at home.    Physical Exam:     Vitals:    07/30/24 1402   BP: 110/70   Site: Right Upper Arm   Position: Sitting   Pulse: 98   Temp: 98.6 °F (37 °C)   TempSrc: Temporal   SpO2: 99%   Weight: 59.3 kg (130 lb 12.8 oz)       Exam:  Physical Exam  Nurse's notes and vital signs reviewed. The patient is not hypoxic.  ?  General: Alert, no acute distress, patient resting comfortably Patient is not toxic or lethargic.  Skin: Warm, intact, no pallor noted. There is no evidence of rash at this time.  Head: Normocephalic, atraumatic  Eye: Normal conjunctiva  Ears, Nose, Throat: Right tympanic membrane clear, left tympanic membrane clear. No drainage or discharge noted. No pre- or post-auricular tenderness, erythema, or swelling noted.   No rhinorrhea or congestion noted.   Posterior oropharynx shows no erythema, tonsillar hypertrophy, or exudate. the uvula is midline. No trismus or drooling is noted.   Moist mucous membranes.  Neck: No anterior/posterior lymphadenopathy noted. No erythema, no masses, no fluctuance or induration noted. No meningeal signs.  Cardiovascular: Regular Rate and Rhythm  Respiratory: No acute distress, no rhonchi, wheezing or crackles noted. No stridor or retractions are noted.  Neurological: A&O x4, normal speech  Psychiatric: Cooperative       Testing:     No results found for this visit on

## 2024-07-31 ENCOUNTER — TELEPHONE (OUTPATIENT)
Dept: PRIMARY CARE CLINIC | Age: 19
End: 2024-07-31

## 2024-07-31 RX ORDER — FLUTICASONE PROPIONATE AND SALMETEROL XINAFOATE 115; 21 UG/1; UG/1
2 AEROSOL, METERED RESPIRATORY (INHALATION) 2 TIMES DAILY
Qty: 1 EACH | Refills: 3 | Status: SHIPPED
Start: 2024-07-31 | End: 2024-08-01

## 2024-08-01 RX ORDER — FLUTICASONE PROPIONATE AND SALMETEROL 250; 50 UG/1; UG/1
1 POWDER RESPIRATORY (INHALATION) 2 TIMES DAILY
Qty: 1 EACH | Refills: 2 | Status: SHIPPED | OUTPATIENT
Start: 2024-08-01

## 2024-08-12 ENCOUNTER — HOSPITAL ENCOUNTER (OUTPATIENT)
Dept: PULMONOLOGY | Age: 19
Discharge: HOME | End: 2024-08-12
Attending: FAMILY MEDICINE
Payer: COMMERCIAL

## 2024-08-12 DIAGNOSIS — J45.20 ASTHMA IN ADULT, MILD INTERMITTENT, UNCOMPLICATED: ICD-10-CM

## 2024-08-12 DIAGNOSIS — R06.2 WHEEZING: ICD-10-CM

## 2024-08-12 PROCEDURE — 94060 EVALUATION OF WHEEZING: CPT

## 2024-08-12 PROCEDURE — 94729 DIFFUSING CAPACITY: CPT

## 2024-08-12 PROCEDURE — 94726 PLETHYSMOGRAPHY LUNG VOLUMES: CPT

## 2024-08-13 NOTE — PROCEDURES
[place] [address]                           PULMONARY FUNCTION      PATIENT NAME: MIGUEL WALDRON             : 2005  MED REC NO: [id number]                        ROOM:   ACCOUNT NO: [id number]                       ADMIT DATE: 2024  PROVIDER: Damir Kaminski DO      DATE OF PROCEDURE: 2024    Pulmonary function test completed.  The patient was having shortness of breath between test and struggled to do some of the maneuvers.  The patient very jittery to albuterol and did not do well with postbronchodilator maneuvers.    SPIROMETRY:  Expiratory flow rates are within normal limits.  Forced vital capacity is 4.32 L which is 105%.  FEV1 is 4.12 L which is 115%.  FEV1/FVC ratio is 95%.  FEF 25%-75% is 116%.  MVV is 85 L/minute which is 72%.  Normal spirometry.  Following post bronchodilator, there is no significant response to bronchodilator in FVC, FEV1, or FEF 25%-75%.    FLOW VOLUME LOOP:  No evidence of intrathoracic or extrathoracic obstruction.  The patient had difficulty on flow volume loop post bronchodilator showing that it was irregular.  This may be due to effort or normal variant.    LUNG VOLUMES:  The lung volumes are within normal limits.  Total lung capacity 6.77 L which is 126%.  RV/TLC ratio is 38%.  SVC is 102%.    DIFFUSION:  DLCO corrected for hemoglobin is 26.22 mL/minute per mmHg which is 113%, is normal.    PFT INTERPRETATION:    1. Normal spirometry without response to bronchodilator.  2. Normal lung volumes.  3. Normal diffusion.    CONCLUSION:  Essentially normal pulmonary function test.    Clinical correlation advised.        DAMIR KAMINSKI DO    D:  2024 13:12:29     T:  2024 04:20:28     RGT/BARONS  Job #:  [id number]     Doc#:  [id number]

## 2024-09-05 ENCOUNTER — OFFICE VISIT (OUTPATIENT)
Dept: PRIMARY CARE CLINIC | Age: 19
End: 2024-09-05
Payer: COMMERCIAL

## 2024-09-05 VITALS
HEIGHT: 66 IN | OXYGEN SATURATION: 99 % | BODY MASS INDEX: 21.21 KG/M2 | WEIGHT: 132 LBS | RESPIRATION RATE: 16 BRPM | DIASTOLIC BLOOD PRESSURE: 70 MMHG | SYSTOLIC BLOOD PRESSURE: 118 MMHG | HEART RATE: 60 BPM

## 2024-09-05 DIAGNOSIS — G47.00 INSOMNIA, UNSPECIFIED TYPE: ICD-10-CM

## 2024-09-05 DIAGNOSIS — R53.83 FATIGUE, UNSPECIFIED TYPE: ICD-10-CM

## 2024-09-05 DIAGNOSIS — U07.1 COVID: ICD-10-CM

## 2024-09-05 DIAGNOSIS — G43.109 MIGRAINE WITH AURA AND WITHOUT STATUS MIGRAINOSUS, NOT INTRACTABLE: Primary | ICD-10-CM

## 2024-09-05 PROCEDURE — G8420 CALC BMI NORM PARAMETERS: HCPCS | Performed by: FAMILY MEDICINE

## 2024-09-05 PROCEDURE — 1036F TOBACCO NON-USER: CPT | Performed by: FAMILY MEDICINE

## 2024-09-05 PROCEDURE — G8427 DOCREV CUR MEDS BY ELIG CLIN: HCPCS | Performed by: FAMILY MEDICINE

## 2024-09-05 PROCEDURE — 99213 OFFICE O/P EST LOW 20 MIN: CPT | Performed by: FAMILY MEDICINE

## 2024-09-05 RX ORDER — HYDROXYZINE HYDROCHLORIDE 25 MG/1
25 TABLET, FILM COATED ORAL NIGHTLY
Qty: 30 TABLET | Refills: 0 | Status: SHIPPED | OUTPATIENT
Start: 2024-09-05 | End: 2024-10-05

## 2024-09-05 RX ORDER — RIZATRIPTAN BENZOATE 5 MG/1
5 TABLET ORAL DAILY PRN
Qty: 6 TABLET | Refills: 0 | Status: SHIPPED | OUTPATIENT
Start: 2024-09-05

## 2024-09-05 ASSESSMENT — ENCOUNTER SYMPTOMS
EYE PAIN: 0
SORE THROAT: 0
APNEA: 0
SINUS PRESSURE: 0
COLOR CHANGE: 0
CHANGE IN BOWEL HABIT: 0
CONSTIPATION: 0
PHOTOPHOBIA: 1
SHORTNESS OF BREATH: 0
VISUAL CHANGE: 0
VOMITING: 0
BACK PAIN: 0
COUGH: 0
RHINORRHEA: 0
EYE REDNESS: 0
SWOLLEN GLANDS: 0
DIARRHEA: 0
ABDOMINAL PAIN: 0
CHEST TIGHTNESS: 0
BLOOD IN STOOL: 0
NAUSEA: 0
EYE ITCHING: 0
WHEEZING: 0
EYE WATERING: 0

## 2024-09-05 NOTE — PROGRESS NOTES
Chief Complaint:     Chief Complaint   Patient presents with    Follow-up    Positive For Covid-19    Migraine                Positive For Covid-19  This is a recurrent problem. The current episode started more than 1 month ago. The problem occurs daily. The problem has been gradually improving. Associated symptoms include fatigue and myalgias. Pertinent negatives include no abdominal pain, arthralgias, change in bowel habit, chest pain, chills, congestion, coughing, diaphoresis, fever, headaches, nausea, neck pain, numbness, rash, sore throat, swollen glands, urinary symptoms, vertigo, visual change, vomiting or weakness. Nothing aggravates the symptoms. She has tried nothing for the symptoms. The treatment provided no relief.   Migraine   This is a chronic problem. The current episode started more than 1 year ago. The problem occurs intermittently. The problem has been waxing and waning. The pain quality is similar to prior headaches. The quality of the pain is described as aching. Associated symptoms include muscle aches, phonophobia and photophobia. Pertinent negatives include no abdominal pain, back pain, coughing, dizziness, ear pain, eye pain, eye redness, eye watering, fever, hearing loss, nausea, neck pain, numbness, rhinorrhea, sinus pressure, sore throat, swollen glands, visual change, vomiting or weakness. Nothing aggravates the symptoms. She has tried triptans and NSAIDs for the symptoms. The treatment provided significant relief. Her past medical history is significant for migraine headaches.       Patient Active Problem List   Diagnosis    Seizure disorder (HCC)    Exercise induced bronchospasm    Migraine with aura and without status migrainosus, not intractable       Past Medical History:   Diagnosis Date    Anxiety     Constipation     Depression     Metabolic syndrome     UTI (urinary tract infection)     patient gets frequently        Past Surgical History:   Procedure Laterality Date

## 2024-09-27 RX ORDER — HYDROXYZINE HYDROCHLORIDE 25 MG/1
25 TABLET, FILM COATED ORAL NIGHTLY
Qty: 90 TABLET | Refills: 1 | Status: SHIPPED | OUTPATIENT
Start: 2024-09-27

## 2024-10-22 ENCOUNTER — OFFICE VISIT (OUTPATIENT)
Dept: FAMILY MEDICINE CLINIC | Age: 19
End: 2024-10-22
Payer: COMMERCIAL

## 2024-10-22 VITALS
WEIGHT: 132 LBS | TEMPERATURE: 100.2 F | BODY MASS INDEX: 21.31 KG/M2 | SYSTOLIC BLOOD PRESSURE: 102 MMHG | OXYGEN SATURATION: 97 % | DIASTOLIC BLOOD PRESSURE: 68 MMHG | HEART RATE: 112 BPM

## 2024-10-22 DIAGNOSIS — R09.81 NASAL CONGESTION: ICD-10-CM

## 2024-10-22 DIAGNOSIS — J02.9 SORE THROAT: ICD-10-CM

## 2024-10-22 DIAGNOSIS — J02.9 ACUTE PHARYNGITIS, UNSPECIFIED ETIOLOGY: Primary | ICD-10-CM

## 2024-10-22 LAB
ADENOVIRUS PCR: NOT DETECTED
BORDETELLA PARAPERTUSSIS BY PCR: NOT DETECTED
BORDETELLA PERTUSSIS PCR: NOT DETECTED
CHLAMYDIA PNEUMONIAE BY PCR: NOT DETECTED
CORONAVIRUS 229E PCR: NOT DETECTED
CORONAVIRUS HKU1 PCR: NOT DETECTED
CORONAVIRUS NL63 PCR: NOT DETECTED
CORONAVIRUS OC43 PCR: NOT DETECTED
HUMAN METAPNEUMOVIRUS PCR: NOT DETECTED
INFLUENZA A ANTIBODY: NORMAL
INFLUENZA A BY PCR: NOT DETECTED
INFLUENZA B ANTIBODY: NORMAL
INFLUENZA B BY PCR: NOT DETECTED
MYCOPLASMA PNEUMONIAE PCR: NOT DETECTED
PARAINFLUENZA 1 PCR: NOT DETECTED
PARAINFLUENZA 2 PCR: NOT DETECTED
PARAINFLUENZA 3 PCR: NOT DETECTED
PARAINFLUENZA 4 PCR: NOT DETECTED
RESP SYNCYTIAL VIRUS PCR: NOT DETECTED
RHINO/ENTEROVIRUS PCR: NOT DETECTED
S PYO AG THROAT QL: NORMAL
SARS-COV-2, PCR: NOT DETECTED
SOURCE: NORMAL

## 2024-10-22 PROCEDURE — G8484 FLU IMMUNIZE NO ADMIN: HCPCS | Performed by: PHYSICIAN ASSISTANT

## 2024-10-22 PROCEDURE — 1036F TOBACCO NON-USER: CPT | Performed by: PHYSICIAN ASSISTANT

## 2024-10-22 PROCEDURE — G8427 DOCREV CUR MEDS BY ELIG CLIN: HCPCS | Performed by: PHYSICIAN ASSISTANT

## 2024-10-22 PROCEDURE — 87880 STREP A ASSAY W/OPTIC: CPT | Performed by: PHYSICIAN ASSISTANT

## 2024-10-22 PROCEDURE — G8420 CALC BMI NORM PARAMETERS: HCPCS | Performed by: PHYSICIAN ASSISTANT

## 2024-10-22 PROCEDURE — 87804 INFLUENZA ASSAY W/OPTIC: CPT | Performed by: PHYSICIAN ASSISTANT

## 2024-10-22 PROCEDURE — 99214 OFFICE O/P EST MOD 30 MIN: CPT | Performed by: PHYSICIAN ASSISTANT

## 2024-10-22 RX ORDER — CEFDINIR 300 MG/1
300 CAPSULE ORAL 2 TIMES DAILY
Qty: 20 CAPSULE | Refills: 0 | Status: SHIPPED | OUTPATIENT
Start: 2024-10-22 | End: 2024-11-01

## 2024-10-22 RX ORDER — NORGESTREL 0.07 MG/1
TABLET ORAL
COMMUNITY

## 2024-10-22 NOTE — PROGRESS NOTES
10/22/24  Gael Montgomery : 2005 Sex: female  Age 19 y.o.      Subjective:  Chief Complaint   Patient presents with    Fever     102-103 at home    Pharyngitis    Congestion    Headache         HPI:   HPI  Gael Montgomery , 19 y.o. female presents to express care for evaluation of fever, myalgias, congestion, sore throat, headache.  The patient has had the symptoms ongoing for the last couple of days.  The patient states that she has had a Tmax of 103.4.  Essentially started yesterday after work.  The patient is not having any chest pain, shortness of breath, no rashes.  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  The patient is not in any apparent distress.  The patient is not really having much of a cough.  She has been taking Tylenol.  She did have COVID about 30 days ago.  The patient is not in any apparent distress.        ROS:   Unless otherwise stated in this report the patient's positive and negative responses for review of systems for constitutional, eyes, ENT, cardiovascular, respiratory, gastrointestinal, neurological, , musculoskeletal, and integument systems and related systems to the presenting problem are either stated in the history of present illness or were not pertinent or were negative for the symptoms and/or complaints related to the presenting medical problem.  Positives and pertinent negatives as per HPI.  All others reviewed and are negative.      PMH:     Past Medical History:   Diagnosis Date    Anxiety     Constipation     Depression     Metabolic syndrome     UTI (urinary tract infection)     patient gets frequently        Past Surgical History:   Procedure Laterality Date    APPENDECTOMY         History reviewed. No pertinent family history.    Medications:     Current Outpatient Medications:     Norgestrel (OPILL) 0.075 MG TABS, , Disp: , Rfl:     cefdinir (OMNICEF) 300 MG capsule, Take 1 capsule by mouth 2 times daily for 10

## 2024-10-23 NOTE — RESULT ENCOUNTER NOTE
If she needs anything else please have her return for evaluation otherwise continue with the antibiotic, Motrin, Tylenol

## 2025-02-20 ENCOUNTER — TELEPHONE (OUTPATIENT)
Dept: NEUROLOGY | Age: 20
End: 2025-02-20

## 2025-02-20 NOTE — TELEPHONE ENCOUNTER
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 Reviewed dx with Adis RASCON, Pt would need to see Psych. Instructed pt that our office does not see for this diagnosis and to check with her PCP.

## 2025-03-11 ENCOUNTER — OFFICE VISIT (OUTPATIENT)
Dept: PRIMARY CARE CLINIC | Age: 20
End: 2025-03-11
Payer: COMMERCIAL

## 2025-03-11 VITALS
DIASTOLIC BLOOD PRESSURE: 78 MMHG | RESPIRATION RATE: 18 BRPM | SYSTOLIC BLOOD PRESSURE: 118 MMHG | BODY MASS INDEX: 21.21 KG/M2 | HEART RATE: 92 BPM | HEIGHT: 66 IN | TEMPERATURE: 97 F | WEIGHT: 132 LBS | OXYGEN SATURATION: 97 %

## 2025-03-11 DIAGNOSIS — R03.0 ELEVATED BLOOD PRESSURE READING: ICD-10-CM

## 2025-03-11 DIAGNOSIS — R42 VERTIGO: ICD-10-CM

## 2025-03-11 DIAGNOSIS — Z00.01 ENCOUNTER FOR GENERAL ADULT MEDICAL EXAMINATION WITH ABNORMAL FINDINGS: ICD-10-CM

## 2025-03-11 DIAGNOSIS — R53.83 FATIGUE, UNSPECIFIED TYPE: ICD-10-CM

## 2025-03-11 DIAGNOSIS — R53.83 FATIGUE, UNSPECIFIED TYPE: Primary | ICD-10-CM

## 2025-03-11 LAB
ALBUMIN: 4.4 G/DL (ref 3.5–5.2)
ALP BLD-CCNC: 64 U/L (ref 35–104)
ALT SERPL-CCNC: 6 U/L (ref 0–32)
ANION GAP SERPL CALCULATED.3IONS-SCNC: 13 MMOL/L (ref 7–16)
AST SERPL-CCNC: 20 U/L (ref 0–31)
BILIRUB SERPL-MCNC: 0.9 MG/DL (ref 0–1.2)
BUN BLDV-MCNC: 10 MG/DL (ref 6–20)
CALCIUM SERPL-MCNC: 9 MG/DL (ref 8.6–10.2)
CHLORIDE BLD-SCNC: 107 MMOL/L (ref 98–107)
CHOLESTEROL, TOTAL: 147 MG/DL
CO2: 20 MMOL/L (ref 22–29)
CREAT SERPL-MCNC: 0.9 MG/DL (ref 0.5–1)
GFR, ESTIMATED: >90 ML/MIN/1.73M2
GLUCOSE BLD-MCNC: 102 MG/DL (ref 74–99)
HCT VFR BLD CALC: 42.9 % (ref 34–48)
HDLC SERPL-MCNC: 57 MG/DL
HEMOGLOBIN: 14.1 G/DL (ref 11.5–15.5)
LDL CHOLESTEROL: 76 MG/DL
MCH RBC QN AUTO: 28.6 PG (ref 26–35)
MCHC RBC AUTO-ENTMCNC: 32.9 G/DL (ref 32–34.5)
MCV RBC AUTO: 87 FL (ref 80–99.9)
PDW BLD-RTO: 13.1 % (ref 11.5–15)
PLATELET # BLD: 226 K/UL (ref 130–450)
PMV BLD AUTO: 11.4 FL (ref 7–12)
POTASSIUM SERPL-SCNC: 4.5 MMOL/L (ref 3.5–5)
RBC # BLD: 4.93 M/UL (ref 3.5–5.5)
SODIUM BLD-SCNC: 140 MMOL/L (ref 132–146)
T4 FREE: 1.2 NG/DL (ref 0.9–1.7)
TOTAL PROTEIN: 6.6 G/DL (ref 6.4–8.3)
TRIGL SERPL-MCNC: 68 MG/DL
TSH SERPL DL<=0.05 MIU/L-ACNC: 1.97 UIU/ML (ref 0.27–4.2)
VLDLC SERPL CALC-MCNC: 14 MG/DL
WBC # BLD: 9.1 K/UL (ref 4.5–11.5)

## 2025-03-11 PROCEDURE — 99214 OFFICE O/P EST MOD 30 MIN: CPT | Performed by: FAMILY MEDICINE

## 2025-03-11 PROCEDURE — G8420 CALC BMI NORM PARAMETERS: HCPCS | Performed by: FAMILY MEDICINE

## 2025-03-11 PROCEDURE — 1036F TOBACCO NON-USER: CPT | Performed by: FAMILY MEDICINE

## 2025-03-11 PROCEDURE — G8427 DOCREV CUR MEDS BY ELIG CLIN: HCPCS | Performed by: FAMILY MEDICINE

## 2025-03-11 RX ORDER — MECLIZINE HCL 12.5 MG 12.5 MG/1
12.5 TABLET ORAL 3 TIMES DAILY PRN
Qty: 30 TABLET | Refills: 0 | Status: SHIPPED | OUTPATIENT
Start: 2025-03-11 | End: 2025-03-21

## 2025-03-11 SDOH — ECONOMIC STABILITY: FOOD INSECURITY: WITHIN THE PAST 12 MONTHS, YOU WORRIED THAT YOUR FOOD WOULD RUN OUT BEFORE YOU GOT MONEY TO BUY MORE.: NEVER TRUE

## 2025-03-11 SDOH — ECONOMIC STABILITY: FOOD INSECURITY: WITHIN THE PAST 12 MONTHS, THE FOOD YOU BOUGHT JUST DIDN'T LAST AND YOU DIDN'T HAVE MONEY TO GET MORE.: NEVER TRUE

## 2025-03-11 ASSESSMENT — PATIENT HEALTH QUESTIONNAIRE - PHQ9
1. LITTLE INTEREST OR PLEASURE IN DOING THINGS: NOT AT ALL
SUM OF ALL RESPONSES TO PHQ QUESTIONS 1-9: 0
2. FEELING DOWN, DEPRESSED OR HOPELESS: NOT AT ALL
SUM OF ALL RESPONSES TO PHQ QUESTIONS 1-9: 0

## 2025-03-11 ASSESSMENT — ENCOUNTER SYMPTOMS
CONSTIPATION: 0
COLOR CHANGE: 0
DIARRHEA: 0
EYE REDNESS: 0
CHEST TIGHTNESS: 0
NAUSEA: 0
RHINORRHEA: 0
SHORTNESS OF BREATH: 0
COUGH: 0
SORE THROAT: 0
SINUS PRESSURE: 0
BLOOD IN STOOL: 0
APNEA: 0
EYE ITCHING: 0
EYE PAIN: 0
VOMITING: 0
WHEEZING: 0
ABDOMINAL PAIN: 0
BACK PAIN: 0

## 2025-03-11 NOTE — PROGRESS NOTES
Chief Complaint:     Chief Complaint   Patient presents with    Hypertension    Lightheadedness         Hypertension  This is a new problem. The current episode started today. The problem has been rapidly improving since onset. The problem is controlled. Associated symptoms include malaise/fatigue. Pertinent negatives include no anxiety, chest pain, headaches, neck pain, palpitations or shortness of breath. (Dizzy spell at work today) There are no known risk factors for coronary artery disease. Past treatments include nothing. The current treatment provides no improvement.   Lightheadedness  This is a new problem. The current episode started today. The problem has been rapidly improving. Associated symptoms include fatigue and vertigo. Pertinent negatives include no abdominal pain, arthralgias, chest pain, chills, congestion, coughing, diaphoresis, fever, headaches, myalgias, nausea, neck pain, numbness, rash, sore throat, vomiting or weakness. Nothing aggravates the symptoms. She has tried nothing for the symptoms. The treatment provided no relief.       Patient Active Problem List   Diagnosis    Seizure disorder (HCC)    Exercise induced bronchospasm    Migraine with aura and without status migrainosus, not intractable       Past Medical History:   Diagnosis Date    Anxiety     Constipation     Depression     Hx of craniotomy 11/29/2024    Metabolic syndrome     UTI (urinary tract infection)     patient gets frequently        Past Surgical History:   Procedure Laterality Date    APPENDECTOMY         Current Outpatient Medications   Medication Sig Dispense Refill    meclizine (ANTIVERT) 12.5 MG tablet Take 1 tablet by mouth 3 times daily as needed for Dizziness 30 tablet 0    Norgestrel (OPILL) 0.075 MG TABS        No current facility-administered medications for this visit.       No Known Allergies    Social History     Socioeconomic History    Marital status: Single     Spouse name: None    Number of children:

## 2025-03-12 ENCOUNTER — RESULTS FOLLOW-UP (OUTPATIENT)
Dept: PRIMARY CARE CLINIC | Age: 20
End: 2025-03-12

## 2025-05-16 ENCOUNTER — OFFICE VISIT (OUTPATIENT)
Age: 20
End: 2025-05-16
Payer: COMMERCIAL

## 2025-05-16 VITALS
HEIGHT: 67 IN | TEMPERATURE: 97.5 F | OXYGEN SATURATION: 99 % | SYSTOLIC BLOOD PRESSURE: 108 MMHG | DIASTOLIC BLOOD PRESSURE: 74 MMHG | BODY MASS INDEX: 20.72 KG/M2 | HEART RATE: 84 BPM | WEIGHT: 132 LBS

## 2025-05-16 DIAGNOSIS — Z87.898 HISTORY OF PSYCHOGENIC NONEPILEPTIC SEIZURE: ICD-10-CM

## 2025-05-16 DIAGNOSIS — G43.111 INTRACTABLE MIGRAINE WITH AURA WITH STATUS MIGRAINOSUS: Primary | ICD-10-CM

## 2025-05-16 PROCEDURE — G8420 CALC BMI NORM PARAMETERS: HCPCS | Performed by: PSYCHIATRY & NEUROLOGY

## 2025-05-16 PROCEDURE — 1036F TOBACCO NON-USER: CPT | Performed by: PSYCHIATRY & NEUROLOGY

## 2025-05-16 PROCEDURE — G8427 DOCREV CUR MEDS BY ELIG CLIN: HCPCS | Performed by: PSYCHIATRY & NEUROLOGY

## 2025-05-16 PROCEDURE — 99204 OFFICE O/P NEW MOD 45 MIN: CPT | Performed by: PSYCHIATRY & NEUROLOGY

## 2025-05-16 RX ORDER — PROMETHAZINE HYDROCHLORIDE 25 MG/1
25 TABLET ORAL EVERY 8 HOURS PRN
Qty: 20 TABLET | Refills: 0 | Status: SHIPPED | OUTPATIENT
Start: 2025-05-16 | End: 2025-05-23

## 2025-05-16 RX ORDER — ZOLMITRIPTAN 2.5 MG/1
2.5 TABLET, FILM COATED ORAL PRN
COMMUNITY
Start: 2025-05-14 | End: 2026-05-14

## 2025-05-16 NOTE — PROGRESS NOTES
[facility] Neurology    Date:  5/16/2025  Patient Name:  Gael Montgomery  YOB: 2005  MRN: [id number]     PCP:  Vazquez Howard DO   Referring:  Vazquez Howard DO      Chief Complaint: psychogenic seizures, migraines    History obtained from: patient, chart    Assessment  Gael Montgomery is a 19 y.o. female with a history of nonepileptic spells (none since 2023) and migraines. Migraines are still occurring 1-2 times per month and can at times be disabling. She has not been successful in finding any effective rescue therapies thus far.      Plan  Trial of rescue therapy  Given samples of Nurtec 75 mg ODT and Ubrelvy 100 mg  Rx'd phenergan 25 mg TID PRN  To notify office if either medication is effective for her current status migrainosus  Follow up in clinic in 2-3 months        History of Present Illness:  Gael Montgomery is a 19 y.o. right handed female presenting for evaluation of PNES and migraines.    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     Her spells are characterized as follows:    She will have a feeling anxiousness about 5 minutes. This will be followed by becoming unresponsive, sometimes with shaking.     She hasn't had any episodes since 2023. No history of epileptic seizures. 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    She still has migraines 1-2 times per month. These usually last a day or two at a time. These are usually located over the forehead, top of the head, and over her surgical incision on the left temple. She does have photo/phonophobia and nausea. No vomiting. Worse with activity.    Past meds include:  Zolmitriptan 2.5  Sumatriptan 50 mg  Meclizine  Ondansetron  Ibuprofen  Naproxen  Tylenol      Medical History:   Past Medical History:   Diagnosis Date    Anxiety     Constipation     Depression     Hx of craniotomy 11/29/2024    Metabolic syndrome     UTI (urinary tract infection)

## 2025-05-19 ENCOUNTER — PATIENT MESSAGE (OUTPATIENT)
Age: 20
End: 2025-05-19

## 2025-05-19 DIAGNOSIS — G43.111 INTRACTABLE MIGRAINE WITH AURA WITH STATUS MIGRAINOSUS: Primary | ICD-10-CM

## 2025-05-21 RX ORDER — RIMEGEPANT SULFATE 75 MG/75MG
75 TABLET, ORALLY DISINTEGRATING ORAL DAILY PRN
Qty: 30 TABLET | Refills: 3 | Status: SHIPPED | OUTPATIENT
Start: 2025-05-21

## 2025-05-22 ENCOUNTER — HOSPITAL ENCOUNTER (EMERGENCY)
Age: 20
Discharge: HOME | End: 2025-05-22
Attending: EMERGENCY MEDICINE
Payer: COMMERCIAL

## 2025-05-22 ENCOUNTER — TELEPHONE (OUTPATIENT)
Dept: PRIMARY CARE CLINIC | Age: 20
End: 2025-05-22

## 2025-05-22 VITALS
HEIGHT: 67 IN | OXYGEN SATURATION: 100 % | SYSTOLIC BLOOD PRESSURE: 113 MMHG | TEMPERATURE: 98.4 F | RESPIRATION RATE: 16 BRPM | BODY MASS INDEX: 20.4 KG/M2 | HEART RATE: 78 BPM | WEIGHT: 130 LBS | DIASTOLIC BLOOD PRESSURE: 79 MMHG

## 2025-05-22 DIAGNOSIS — T50.905A ADVERSE EFFECT OF DRUG, INITIAL ENCOUNTER: Primary | ICD-10-CM

## 2025-05-22 DIAGNOSIS — G43.909 MIGRAINE WITHOUT STATUS MIGRAINOSUS, NOT INTRACTABLE, UNSPECIFIED MIGRAINE TYPE: ICD-10-CM

## 2025-05-22 LAB
ANION GAP SERPL CALCULATED.3IONS-SCNC: 9 MMOL/L (ref 7–16)
BUN SERPL-MCNC: 11 MG/DL (ref 6–20)
CALCIUM SERPL-MCNC: 9.2 MG/DL (ref 8.6–10.2)
CHLORIDE SERPL-SCNC: 104 MMOL/L (ref 98–107)
CO2 SERPL-SCNC: 26 MMOL/L (ref 22–29)
CREAT SERPL-MCNC: 0.8 MG/DL (ref 0.5–1)
ERYTHROCYTE [DISTWIDTH] IN BLOOD BY AUTOMATED COUNT: 13 % (ref 11.5–15)
GFR, ESTIMATED: >90 ML/MIN/1.73M2
GLUCOSE SERPL-MCNC: 107 MG/DL (ref 74–99)
HCT VFR BLD AUTO: 40 % (ref 34–48)
HGB BLD-MCNC: 13.6 G/DL (ref 11.5–15.5)
MCH RBC QN AUTO: 28.8 PG (ref 26–35)
MCHC RBC AUTO-ENTMCNC: 34 G/DL (ref 32–34.5)
MCV RBC AUTO: 84.7 FL (ref 80–99.9)
PLATELET # BLD AUTO: 238 K/UL (ref 130–450)
PMV BLD AUTO: 10.5 FL (ref 7–12)
POTASSIUM SERPL-SCNC: 4.2 MMOL/L (ref 3.5–5)
RBC # BLD AUTO: 4.72 M/UL (ref 3.5–5.5)
SODIUM SERPL-SCNC: 139 MMOL/L (ref 132–146)
WBC OTHER # BLD: 6.9 K/UL (ref 4.5–11.5)

## 2025-05-22 PROCEDURE — 96374 THER/PROPH/DIAG INJ IV PUSH: CPT

## 2025-05-22 PROCEDURE — 80048 BASIC METABOLIC PNL TOTAL CA: CPT

## 2025-05-22 PROCEDURE — 99284 EMERGENCY DEPT VISIT MOD MDM: CPT

## 2025-05-22 PROCEDURE — 96361 HYDRATE IV INFUSION ADD-ON: CPT

## 2025-05-22 PROCEDURE — 85027 COMPLETE CBC AUTOMATED: CPT

## 2025-05-22 PROCEDURE — 96375 TX/PRO/DX INJ NEW DRUG ADDON: CPT

## 2025-05-22 PROCEDURE — 6360000002 HC RX W HCPCS: Performed by: EMERGENCY MEDICINE

## 2025-05-22 PROCEDURE — 2580000003 HC RX 258: Performed by: EMERGENCY MEDICINE

## 2025-05-22 RX ORDER — PROCHLORPERAZINE EDISYLATE 5 MG/ML
5 INJECTION INTRAMUSCULAR; INTRAVENOUS ONCE
Status: COMPLETED | OUTPATIENT
Start: 2025-05-22 | End: 2025-05-22

## 2025-05-22 RX ORDER — DIPHENHYDRAMINE HYDROCHLORIDE 50 MG/ML
25 INJECTION, SOLUTION INTRAMUSCULAR; INTRAVENOUS ONCE
Status: COMPLETED | OUTPATIENT
Start: 2025-05-22 | End: 2025-05-22

## 2025-05-22 RX ORDER — 0.9 % SODIUM CHLORIDE 0.9 %
1000 INTRAVENOUS SOLUTION INTRAVENOUS ONCE
Status: COMPLETED | OUTPATIENT
Start: 2025-05-22 | End: 2025-05-22

## 2025-05-22 RX ORDER — KETOROLAC TROMETHAMINE 15 MG/ML
15 INJECTION, SOLUTION INTRAMUSCULAR; INTRAVENOUS ONCE
Status: COMPLETED | OUTPATIENT
Start: 2025-05-22 | End: 2025-05-22

## 2025-05-22 RX ADMIN — SODIUM CHLORIDE 1000 ML: 0.9 INJECTION, SOLUTION INTRAVENOUS at 14:34

## 2025-05-22 RX ADMIN — DIPHENHYDRAMINE HYDROCHLORIDE 25 MG: 50 INJECTION INTRAMUSCULAR; INTRAVENOUS at 14:35

## 2025-05-22 RX ADMIN — KETOROLAC TROMETHAMINE 15 MG: 15 INJECTION, SOLUTION INTRAMUSCULAR; INTRAVENOUS at 14:34

## 2025-05-22 RX ADMIN — PROCHLORPERAZINE EDISYLATE 5 MG: 5 INJECTION INTRAMUSCULAR; INTRAVENOUS at 14:34

## 2025-05-22 ASSESSMENT — LIFESTYLE VARIABLES
HOW OFTEN DO YOU HAVE A DRINK CONTAINING ALCOHOL: 2-4 TIMES A MONTH
HOW MANY STANDARD DRINKS CONTAINING ALCOHOL DO YOU HAVE ON A TYPICAL DAY: 1 OR 2

## 2025-05-22 NOTE — TELEPHONE ENCOUNTER
Patient feels like she's having a reaction to Zomig.  She's been on this 4 days, [facility].  She's having lightheadedness, dizzy, feels like she has pins and needles in her fingers.  Her last dose was 7 pm last night.  She took her BP this morning and it was 160/110.  Please advise.  ER or walk in?  Please call patient

## 2025-05-22 NOTE — ED PROVIDER NOTES
HPI:  5/22/25,   Time: 2:17 PM EDT         Gael Montgomery is a 19 y.o. female presenting to the ED for evaluation of shortness of breath as well as numbness and tingling in the hands after administration of Romig this morning.  She states she took medication around 10 AM this morning and the symptoms started shortly thereafter.  Shortness of breath started first.  She had similar symptoms yesterday when she started taking it.  She was recently diagnosed with migraine and just started on this medication.  She denies recent illness otherwise.  Complains of generalized headache consistent with her migraines.  She has had nausea with some emesis.  Denies fevers or chills.  No abdominal pain    ROS:   Pertinent positives and negatives are stated within HPI, all other systems reviewed and are negative.  --------------------------------------------- PAST HISTORY ---------------------------------------------  Past Medical History:  has a past medical history of Anxiety, Constipation, Depression, Hx of craniotomy, Metabolic syndrome, and UTI (urinary tract infection).    Past Surgical History:  has a past surgical history that includes Appendectomy.    Social History:  reports that she has never smoked. She has never used smokeless tobacco. She reports that she does not drink alcohol and does not use drugs.    Family History: family history is not on file.     The patient’s home medications have been reviewed.    Allergies: Patient has no known allergies.    -------------------------------------------------- RESULTS -------------------------------------------------  All laboratory and radiology results have been personally reviewed by myself   LABS:  Results for orders placed or performed during the hospital encounter of 05/22/25   Basic Metabolic Panel   Result Value Ref Range    Sodium 139 132 - 146 mmol/L    Potassium 4.2 3.5 - 5.0 mmol/L    Chloride 104 98 - 107 mmol/L    CO2 26 22 - 29 mmol/L    Anion Gap 9 7 - 16

## 2025-07-11 ENCOUNTER — OFFICE VISIT (OUTPATIENT)
Dept: ORTHOPEDIC SURGERY | Age: 20
End: 2025-07-11
Payer: COMMERCIAL

## 2025-07-11 DIAGNOSIS — S99.911A INJURY OF RIGHT ANKLE, INITIAL ENCOUNTER: Primary | ICD-10-CM

## 2025-07-11 PROCEDURE — G8420 CALC BMI NORM PARAMETERS: HCPCS | Performed by: NURSE PRACTITIONER

## 2025-07-11 PROCEDURE — 99202 OFFICE O/P NEW SF 15 MIN: CPT | Performed by: NURSE PRACTITIONER

## 2025-07-11 PROCEDURE — G8428 CUR MEDS NOT DOCUMENT: HCPCS | Performed by: NURSE PRACTITIONER

## 2025-07-11 PROCEDURE — 1036F TOBACCO NON-USER: CPT | Performed by: NURSE PRACTITIONER

## 2025-07-11 NOTE — PROGRESS NOTES
Allergies    Controlled Substances Monitoring:        REVIEW OF SYSTEMS:     Constitutional:  Negative for weight loss, fevers, chills, fatigue  Cardiovascular: Negative for chest pain, palpitations  Pulmonary: Negative for shortness of breath, labored breathing, cough  GI: negative for abdominal pain, nausea, vomiting   MSK: per HPI  Skin: negative for rash, open wounds    All other systems reviewed and are negative       PHYSICAL EXAM     There were no vitals filed for this visit.    Height:    Weight: [unfilled] BMI:  There is no height or weight on file to calculate BMI.    General: The patient is alert and oriented x 3, appears to be stated age and in no distress.   HEENT: head is normocephalic, atraumatic.  EOMI.   Neck: supple, trachea midline, no thyromegaly   Cardiovascular: peripheral pulses palpable.  Normal Capillary refill   Respiratory: breathing unlabored, chest expansion symmetric   Skin: no rash, no open wounds, no erythema  Psych: normal affect; mood stable  Neurologic: gait normal, sensation grossly intact in extremities  MSK:    Foot/Ankle:   Right Ankle exam displays mild swelling, negative ecchymosis.   There is no deformity. Range of motion is 10 degrees dorsiflexion, 45 degrees plantarflexion.  Resisted external rotation is positive for pain.    Resisted internal rotation is positive for pain.    Palpation of the lateral malleolus reveals mild tenderness.    Palpation of the medial malleolus reveals mild tenderness.    Palpation ATFL reveals moderate tenderness.    Palpation over deltoid ligament reveals no tenderness.    Palpation over the 5th metartarsal reveals no tenderness.    Palpation of the achilles tendon reveals no tenderness        IMAGING:    XR: Xray of the Right ankle shows no fracture or other bony abnormality.      Imaging discussed with patient    ASSESSMENT  Right ankle sprain    PLAN  Today discussed the right ankle.  Patient suffered apparent ankle sprain when she rolled

## 2025-08-07 ENCOUNTER — OFFICE VISIT (OUTPATIENT)
Dept: PRIMARY CARE CLINIC | Age: 20
End: 2025-08-07

## 2025-08-07 VITALS
TEMPERATURE: 98 F | BODY MASS INDEX: 21.5 KG/M2 | HEIGHT: 67 IN | RESPIRATION RATE: 16 BRPM | SYSTOLIC BLOOD PRESSURE: 118 MMHG | DIASTOLIC BLOOD PRESSURE: 70 MMHG | WEIGHT: 137 LBS | HEART RATE: 114 BPM | OXYGEN SATURATION: 98 %

## 2025-08-07 DIAGNOSIS — T78.40XD ALLERGIC REACTION, SUBSEQUENT ENCOUNTER: ICD-10-CM

## 2025-08-07 DIAGNOSIS — R53.83 FATIGUE, UNSPECIFIED TYPE: ICD-10-CM

## 2025-08-07 DIAGNOSIS — M79.10 MYALGIA: ICD-10-CM

## 2025-08-07 DIAGNOSIS — G43.109 MIGRAINE WITH AURA AND WITHOUT STATUS MIGRAINOSUS, NOT INTRACTABLE: Primary | ICD-10-CM

## 2025-08-07 DIAGNOSIS — G40.909 SEIZURE DISORDER (HCC): ICD-10-CM

## 2025-08-07 LAB
ALBUMIN: 4.7 G/DL (ref 3.5–5.2)
ALP BLD-CCNC: 68 U/L (ref 35–104)
ALT SERPL-CCNC: 8 U/L (ref 0–35)
ANION GAP SERPL CALCULATED.3IONS-SCNC: 12 MMOL/L (ref 7–16)
AST SERPL-CCNC: 22 U/L (ref 0–35)
BILIRUB SERPL-MCNC: 0.8 MG/DL (ref 0–1.2)
BUN BLDV-MCNC: 12 MG/DL (ref 6–20)
C-REACTIVE PROTEIN: <3 MG/L (ref 0–5)
CALCIUM SERPL-MCNC: 10 MG/DL (ref 8.6–10)
CHLORIDE BLD-SCNC: 105 MMOL/L (ref 98–107)
CO2: 24 MMOL/L (ref 22–29)
CORTISOL COLLECTION INFO: 0
CORTISOL: 14.3 UG/DL (ref 2.7–18.4)
CREAT SERPL-MCNC: 0.9 MG/DL (ref 0.5–1)
FOLLICLE STIMULATING HORMONE: 6.2 MIU/ML
GFR, ESTIMATED: 88 ML/MIN/1.73M2
GLUCOSE BLD-MCNC: 95 MG/DL (ref 74–99)
HCT VFR BLD CALC: 43.3 % (ref 34–48)
HEMOGLOBIN: 14.5 G/DL (ref 11.5–15.5)
LH: 20 MIU/ML
MCH RBC QN AUTO: 29 PG (ref 26–35)
MCHC RBC AUTO-ENTMCNC: 33.5 G/DL (ref 32–34.5)
MCV RBC AUTO: 86.6 FL (ref 80–99.9)
PDW BLD-RTO: 12.6 % (ref 11.5–15)
PLATELET # BLD: 261 K/UL (ref 130–450)
PMV BLD AUTO: 11.3 FL (ref 7–12)
POTASSIUM SERPL-SCNC: 4.2 MMOL/L (ref 3.5–5.1)
RBC # BLD: 5 M/UL (ref 3.5–5.5)
RHEUMATOID FACTOR: <10 IU/ML (ref 0–14)
SED RATE, AUTOMATED: 0 MM/HR (ref 0–20)
SODIUM BLD-SCNC: 141 MMOL/L (ref 136–145)
T4 FREE: 1.3 NG/DL (ref 0.9–1.7)
TOTAL PROTEIN: 7.4 G/DL (ref 6.4–8.3)
TSH SERPL DL<=0.05 MIU/L-ACNC: 2.71 UIU/ML (ref 0.27–4.2)
WBC # BLD: 6.7 K/UL (ref 4.5–11.5)

## 2025-08-07 ASSESSMENT — ENCOUNTER SYMPTOMS
COLOR CHANGE: 0
EYE REDNESS: 0
VOMITING: 0
EYE WATERING: 0
VISUAL CHANGE: 0
NAUSEA: 0
SINUS PRESSURE: 0
DIARRHEA: 0
BLOOD IN STOOL: 0
HYPERVENTILATION: 0
CHEST TIGHTNESS: 0
WHEEZING: 0
SHORTNESS OF BREATH: 0
EYE ITCHING: 0
ALLERGIC REACTION: 1
TROUBLE SWALLOWING: 0
SWOLLEN GLANDS: 0
EYE PAIN: 0
STRIDOR: 0
GLOBUS SENSATION: 0
ABDOMINAL PAIN: 0
SORE THROAT: 0
DIFFICULTY BREATHING: 0
APNEA: 0
RHINORRHEA: 0
BACK PAIN: 0
PHOTOPHOBIA: 1
CONSTIPATION: 0
COUGH: 0

## 2025-08-11 LAB
ANA PATTERN: ABNORMAL
ANA TITER: ABNORMAL
ANTI-NUCLEAR ANTIBODY (ANA): POSITIVE